# Patient Record
Sex: FEMALE | Race: WHITE | NOT HISPANIC OR LATINO
[De-identification: names, ages, dates, MRNs, and addresses within clinical notes are randomized per-mention and may not be internally consistent; named-entity substitution may affect disease eponyms.]

---

## 2017-01-24 ENCOUNTER — APPOINTMENT (OUTPATIENT)
Dept: ANTEPARTUM | Facility: CLINIC | Age: 37
End: 2017-01-24

## 2017-01-24 ENCOUNTER — ASOB RESULT (OUTPATIENT)
Age: 37
End: 2017-01-24

## 2017-01-24 ENCOUNTER — LABORATORY RESULT (OUTPATIENT)
Age: 37
End: 2017-01-24

## 2017-01-24 ENCOUNTER — APPOINTMENT (OUTPATIENT)
Dept: MATERNAL FETAL MEDICINE | Facility: CLINIC | Age: 37
End: 2017-01-24

## 2017-01-24 VITALS
SYSTOLIC BLOOD PRESSURE: 108 MMHG | HEART RATE: 98 BPM | BODY MASS INDEX: 26.35 KG/M2 | DIASTOLIC BLOOD PRESSURE: 68 MMHG | HEIGHT: 62 IN | WEIGHT: 143.2 LBS | OXYGEN SATURATION: 100 %

## 2017-01-24 LAB
BILIRUB UR QL STRIP: NEGATIVE
COLLECTION METHOD: NORMAL
GLUCOSE UR-MCNC: NEGATIVE
HCG UR QL: 0.2 EU/DL
HGB UR QL STRIP.AUTO: NEGATIVE
KETONES UR-MCNC: NEGATIVE
LEUKOCYTE ESTERASE UR QL STRIP: NORMAL
NITRITE UR QL STRIP: NEGATIVE
PH UR STRIP: 7.5
PROT UR STRIP-MCNC: NORMAL
SP GR UR STRIP: 1.02

## 2017-01-30 LAB
BASOPHILS # BLD AUTO: 0.02 K/UL
BASOPHILS NFR BLD AUTO: 0.3 %
EOSINOPHIL # BLD AUTO: 0.05 K/UL
EOSINOPHIL NFR BLD AUTO: 0.7 %
HCT VFR BLD CALC: 34.5 %
HGB BLD-MCNC: 11.5 G/DL
IMM GRANULOCYTES NFR BLD AUTO: 0.3 %
LYMPHOCYTES # BLD AUTO: 0.8 K/UL
LYMPHOCYTES NFR BLD AUTO: 11.3 %
MAN DIFF?: NORMAL
MCHC RBC-ENTMCNC: 30.3 PG
MCHC RBC-ENTMCNC: 33.3 GM/DL
MCV RBC AUTO: 91 FL
MONOCYTES # BLD AUTO: 0.73 K/UL
MONOCYTES NFR BLD AUTO: 10.3 %
NEUTROPHILS # BLD AUTO: 5.46 K/UL
NEUTROPHILS NFR BLD AUTO: 77.1 %
PLATELET # BLD AUTO: 252 K/UL
PLATELET # BLD AUTO: 252 K/UL
RBC # BLD: 3.79 M/UL
RBC # FLD: 14.3 %
WBC # FLD AUTO: 7.08 K/UL

## 2017-02-21 ENCOUNTER — APPOINTMENT (OUTPATIENT)
Dept: ANTEPARTUM | Facility: CLINIC | Age: 37
End: 2017-02-21

## 2017-02-21 ENCOUNTER — ASOB RESULT (OUTPATIENT)
Age: 37
End: 2017-02-21

## 2017-02-21 ENCOUNTER — APPOINTMENT (OUTPATIENT)
Dept: MATERNAL FETAL MEDICINE | Facility: CLINIC | Age: 37
End: 2017-02-21

## 2017-02-21 VITALS
HEIGHT: 62 IN | BODY MASS INDEX: 26.31 KG/M2 | SYSTOLIC BLOOD PRESSURE: 108 MMHG | DIASTOLIC BLOOD PRESSURE: 70 MMHG | WEIGHT: 143 LBS

## 2017-02-21 LAB
BILIRUB UR QL STRIP: NEGATIVE
COLLECTION METHOD: NORMAL
GLUCOSE UR-MCNC: NEGATIVE
HCG UR QL: 0.2 EU/DL
HGB UR QL STRIP.AUTO: NEGATIVE
KETONES UR-MCNC: NEGATIVE
LEUKOCYTE ESTERASE UR QL STRIP: NEGATIVE
NITRITE UR QL STRIP: NEGATIVE
PH UR STRIP: 6.5
PROT UR STRIP-MCNC: NEGATIVE
SP GR UR STRIP: <=1.005

## 2017-02-22 LAB — GLUCOSE 1H P 50 G GLC PO SERPL-MCNC: 132 MG/DL

## 2017-03-21 ENCOUNTER — ASOB RESULT (OUTPATIENT)
Age: 37
End: 2017-03-21

## 2017-03-21 ENCOUNTER — APPOINTMENT (OUTPATIENT)
Dept: ANTEPARTUM | Facility: CLINIC | Age: 37
End: 2017-03-21

## 2017-03-21 ENCOUNTER — APPOINTMENT (OUTPATIENT)
Dept: MATERNAL FETAL MEDICINE | Facility: CLINIC | Age: 37
End: 2017-03-21

## 2017-03-21 VITALS
BODY MASS INDEX: 28.63 KG/M2 | DIASTOLIC BLOOD PRESSURE: 64 MMHG | HEIGHT: 62 IN | WEIGHT: 155.6 LBS | SYSTOLIC BLOOD PRESSURE: 100 MMHG

## 2017-03-21 VITALS — HEART RATE: 81 BPM | OXYGEN SATURATION: 100 %

## 2017-03-21 LAB
BILIRUB UR QL STRIP: NEGATIVE
COLLECTION METHOD: NORMAL
GLUCOSE UR-MCNC: NEGATIVE
HCG UR QL: 0.2 EU/DL
HGB UR QL STRIP.AUTO: NEGATIVE
KETONES UR-MCNC: NEGATIVE
LEUKOCYTE ESTERASE UR QL STRIP: NORMAL
NITRITE UR QL STRIP: NEGATIVE
PH UR STRIP: 6.5
PROT UR STRIP-MCNC: NEGATIVE
SP GR UR STRIP: <=1.05

## 2017-04-18 ENCOUNTER — APPOINTMENT (OUTPATIENT)
Dept: ANTEPARTUM | Facility: CLINIC | Age: 37
End: 2017-04-18

## 2017-04-18 ENCOUNTER — ASOB RESULT (OUTPATIENT)
Age: 37
End: 2017-04-18

## 2017-05-18 ENCOUNTER — APPOINTMENT (OUTPATIENT)
Dept: MATERNAL FETAL MEDICINE | Facility: CLINIC | Age: 37
End: 2017-05-18

## 2017-05-18 ENCOUNTER — APPOINTMENT (OUTPATIENT)
Dept: ANTEPARTUM | Facility: CLINIC | Age: 37
End: 2017-05-18

## 2017-05-18 ENCOUNTER — ASOB RESULT (OUTPATIENT)
Age: 37
End: 2017-05-18

## 2017-05-18 VITALS
SYSTOLIC BLOOD PRESSURE: 120 MMHG | OXYGEN SATURATION: 97 % | DIASTOLIC BLOOD PRESSURE: 70 MMHG | WEIGHT: 165 LBS | BODY MASS INDEX: 30.36 KG/M2 | HEART RATE: 106 BPM | HEIGHT: 62 IN

## 2017-05-18 LAB
BILIRUB UR QL STRIP: NEGATIVE
COLLECTION METHOD: NORMAL
GLUCOSE UR-MCNC: NEGATIVE
HCG UR QL: 1 EU/DL
HGB UR QL STRIP.AUTO: NEGATIVE
KETONES UR-MCNC: NORMAL
LEUKOCYTE ESTERASE UR QL STRIP: NORMAL
NITRITE UR QL STRIP: NEGATIVE
PH UR STRIP: 6.5
PROT UR STRIP-MCNC: NORMAL
SP GR UR STRIP: 1.02

## 2017-05-19 ENCOUNTER — ASOB RESULT (OUTPATIENT)
Age: 37
End: 2017-05-19

## 2017-06-28 ENCOUNTER — OUTPATIENT (OUTPATIENT)
Dept: OUTPATIENT SERVICES | Facility: HOSPITAL | Age: 37
LOS: 1 days | End: 2017-06-28
Payer: COMMERCIAL

## 2017-06-28 DIAGNOSIS — Z87.2 PERSONAL HISTORY OF DISEASES OF THE SKIN AND SUBCUTANEOUS TISSUE: Chronic | ICD-10-CM

## 2017-06-28 DIAGNOSIS — Z98.89 OTHER SPECIFIED POSTPROCEDURAL STATES: Chronic | ICD-10-CM

## 2017-06-28 DIAGNOSIS — Z01.818 ENCOUNTER FOR OTHER PREPROCEDURAL EXAMINATION: ICD-10-CM

## 2017-06-28 LAB
ANION GAP SERPL CALC-SCNC: 15 MMOL/L — SIGNIFICANT CHANGE UP (ref 5–17)
APPEARANCE UR: CLEAR — SIGNIFICANT CHANGE UP
BACTERIA # UR AUTO: ABNORMAL
BASOPHILS # BLD AUTO: 0 K/UL — SIGNIFICANT CHANGE UP (ref 0–0.2)
BASOPHILS NFR BLD AUTO: 0.2 % — SIGNIFICANT CHANGE UP (ref 0–2)
BILIRUB UR-MCNC: NEGATIVE — SIGNIFICANT CHANGE UP
BLD GP AB SCN SERPL QL: SIGNIFICANT CHANGE UP
BUN SERPL-MCNC: 10 MG/DL — SIGNIFICANT CHANGE UP (ref 8–20)
CALCIUM SERPL-MCNC: 9 MG/DL — SIGNIFICANT CHANGE UP (ref 8.6–10.2)
CHLORIDE SERPL-SCNC: 99 MMOL/L — SIGNIFICANT CHANGE UP (ref 98–107)
CO2 SERPL-SCNC: 21 MMOL/L — LOW (ref 22–29)
COLOR SPEC: YELLOW — SIGNIFICANT CHANGE UP
COMMENT - BLOOD BANK: SIGNIFICANT CHANGE UP
CREAT SERPL-MCNC: 0.37 MG/DL — LOW (ref 0.5–1.3)
DIFF PNL FLD: NEGATIVE — SIGNIFICANT CHANGE UP
EOSINOPHIL # BLD AUTO: 0.1 K/UL — SIGNIFICANT CHANGE UP (ref 0–0.5)
EOSINOPHIL NFR BLD AUTO: 0.5 % — SIGNIFICANT CHANGE UP (ref 0–6)
EPI CELLS # UR: ABNORMAL
GLUCOSE SERPL-MCNC: 106 MG/DL — SIGNIFICANT CHANGE UP (ref 70–115)
GLUCOSE UR QL: NEGATIVE MG/DL — SIGNIFICANT CHANGE UP
HCT VFR BLD CALC: 35.7 % — LOW (ref 37–47)
HGB BLD-MCNC: 12.2 G/DL — SIGNIFICANT CHANGE UP (ref 12–16)
KETONES UR-MCNC: NEGATIVE — SIGNIFICANT CHANGE UP
LEUKOCYTE ESTERASE UR-ACNC: ABNORMAL
LYMPHOCYTES # BLD AUTO: 17.5 % — LOW (ref 20–55)
LYMPHOCYTES # BLD AUTO: 2 K/UL — SIGNIFICANT CHANGE UP (ref 1–4.8)
MCHC RBC-ENTMCNC: 30.7 PG — SIGNIFICANT CHANGE UP (ref 27–31)
MCHC RBC-ENTMCNC: 34.2 G/DL — SIGNIFICANT CHANGE UP (ref 32–36)
MCV RBC AUTO: 89.9 FL — SIGNIFICANT CHANGE UP (ref 81–99)
MONOCYTES # BLD AUTO: 0.7 K/UL — SIGNIFICANT CHANGE UP (ref 0–0.8)
MONOCYTES NFR BLD AUTO: 6.1 % — SIGNIFICANT CHANGE UP (ref 3–10)
NEUTROPHILS # BLD AUTO: 8.3 K/UL — HIGH (ref 1.8–8)
NEUTROPHILS NFR BLD AUTO: 74.4 % — HIGH (ref 37–73)
NITRITE UR-MCNC: NEGATIVE — SIGNIFICANT CHANGE UP
PH UR: 7 — SIGNIFICANT CHANGE UP (ref 5–8)
PLATELET # BLD AUTO: 243 K/UL — SIGNIFICANT CHANGE UP (ref 150–400)
POTASSIUM SERPL-MCNC: 3.9 MMOL/L — SIGNIFICANT CHANGE UP (ref 3.5–5.3)
POTASSIUM SERPL-SCNC: 3.9 MMOL/L — SIGNIFICANT CHANGE UP (ref 3.5–5.3)
PROT UR-MCNC: NEGATIVE MG/DL — SIGNIFICANT CHANGE UP
RBC # BLD: 3.97 M/UL — LOW (ref 4.4–5.2)
RBC # FLD: 14.2 % — SIGNIFICANT CHANGE UP (ref 11–15.6)
SODIUM SERPL-SCNC: 135 MMOL/L — SIGNIFICANT CHANGE UP (ref 135–145)
SP GR SPEC: 1.01 — SIGNIFICANT CHANGE UP (ref 1.01–1.02)
TYPE + AB SCN PNL BLD: SIGNIFICANT CHANGE UP
UROBILINOGEN FLD QL: NEGATIVE MG/DL — SIGNIFICANT CHANGE UP
WBC # BLD: 11.1 K/UL — HIGH (ref 4.8–10.8)
WBC # FLD AUTO: 11.1 K/UL — HIGH (ref 4.8–10.8)
WBC UR QL: ABNORMAL

## 2017-06-28 PROCEDURE — 86901 BLOOD TYPING SEROLOGIC RH(D): CPT

## 2017-06-28 PROCEDURE — 86900 BLOOD TYPING SEROLOGIC ABO: CPT

## 2017-06-28 PROCEDURE — 80048 BASIC METABOLIC PNL TOTAL CA: CPT

## 2017-06-28 PROCEDURE — 86850 RBC ANTIBODY SCREEN: CPT

## 2017-06-28 PROCEDURE — 36415 COLL VENOUS BLD VENIPUNCTURE: CPT

## 2017-06-28 PROCEDURE — 81001 URINALYSIS AUTO W/SCOPE: CPT

## 2017-06-28 PROCEDURE — 85027 COMPLETE CBC AUTOMATED: CPT

## 2017-07-05 ENCOUNTER — TRANSCRIPTION ENCOUNTER (OUTPATIENT)
Age: 37
End: 2017-07-05

## 2017-07-05 ENCOUNTER — RESULT REVIEW (OUTPATIENT)
Age: 37
End: 2017-07-05

## 2017-07-05 ENCOUNTER — INPATIENT (INPATIENT)
Facility: HOSPITAL | Age: 37
LOS: 2 days | Discharge: HOME | End: 2017-07-08
Attending: SPECIALIST | Admitting: SPECIALIST
Payer: COMMERCIAL

## 2017-07-05 VITALS — WEIGHT: 169.76 LBS

## 2017-07-05 DIAGNOSIS — Z87.2 PERSONAL HISTORY OF DISEASES OF THE SKIN AND SUBCUTANEOUS TISSUE: Chronic | ICD-10-CM

## 2017-07-05 DIAGNOSIS — O34.219 MATERNAL CARE FOR UNSPECIFIED TYPE SCAR FROM PREVIOUS CESAREAN DELIVERY: ICD-10-CM

## 2017-07-05 DIAGNOSIS — Z98.89 OTHER SPECIFIED POSTPROCEDURAL STATES: Chronic | ICD-10-CM

## 2017-07-05 LAB
BLD GP AB SCN SERPL QL: SIGNIFICANT CHANGE UP
GAS PNL BLDCOV: 7.34 — SIGNIFICANT CHANGE UP (ref 7.11–7.36)
PCO2 BLDCOA: 55.7 MMHG — SIGNIFICANT CHANGE UP (ref 32.2–65.8)
PCO2 BLDCOV: 44.9 MMHG — SIGNIFICANT CHANGE UP (ref 27–49.4)
PH BLDCOA: 7.27 — SIGNIFICANT CHANGE UP (ref 7.11–7.36)
PO2 BLDCOA: 15.1 MMHG — SIGNIFICANT CHANGE UP (ref 6–30)
PO2 BLDCOA: 26.5 MMHG — SIGNIFICANT CHANGE UP (ref 17.4–41)
RPR SERPL-ACNC: SIGNIFICANT CHANGE UP
SAO2 % BLDCOA: SIGNIFICANT CHANGE UP
SAO2 % BLDCOV: SIGNIFICANT CHANGE UP
TYPE + AB SCN PNL BLD: SIGNIFICANT CHANGE UP

## 2017-07-05 PROCEDURE — 88304 TISSUE EXAM BY PATHOLOGIST: CPT | Mod: 26

## 2017-07-05 RX ORDER — ACETAMINOPHEN 500 MG
1000 TABLET ORAL ONCE
Qty: 0 | Refills: 0 | Status: DISCONTINUED | OUTPATIENT
Start: 2017-07-05 | End: 2017-07-08

## 2017-07-05 RX ORDER — ONDANSETRON 8 MG/1
4 TABLET, FILM COATED ORAL EVERY 6 HOURS
Qty: 0 | Refills: 0 | Status: DISCONTINUED | OUTPATIENT
Start: 2017-07-05 | End: 2017-07-08

## 2017-07-05 RX ORDER — OXYTOCIN 10 UNIT/ML
333.33 VIAL (ML) INJECTION
Qty: 20 | Refills: 0 | Status: DISCONTINUED | OUTPATIENT
Start: 2017-07-05 | End: 2017-07-08

## 2017-07-05 RX ORDER — LANOLIN
1 OINTMENT (GRAM) TOPICAL
Qty: 0 | Refills: 0 | Status: DISCONTINUED | OUTPATIENT
Start: 2017-07-05 | End: 2017-07-08

## 2017-07-05 RX ORDER — SODIUM CHLORIDE 9 MG/ML
1000 INJECTION, SOLUTION INTRAVENOUS ONCE
Qty: 0 | Refills: 0 | Status: COMPLETED | OUTPATIENT
Start: 2017-07-05 | End: 2017-07-05

## 2017-07-05 RX ORDER — KETOROLAC TROMETHAMINE 30 MG/ML
30 SYRINGE (ML) INJECTION ONCE
Qty: 0 | Refills: 0 | Status: DISCONTINUED | OUTPATIENT
Start: 2017-07-05 | End: 2017-07-05

## 2017-07-05 RX ORDER — DIPHENHYDRAMINE HCL 50 MG
25 CAPSULE ORAL EVERY 6 HOURS
Qty: 0 | Refills: 0 | Status: DISCONTINUED | OUTPATIENT
Start: 2017-07-05 | End: 2017-07-08

## 2017-07-05 RX ORDER — DOCUSATE SODIUM 100 MG
100 CAPSULE ORAL
Qty: 0 | Refills: 0 | Status: DISCONTINUED | OUTPATIENT
Start: 2017-07-05 | End: 2017-07-08

## 2017-07-05 RX ORDER — ACETAMINOPHEN 500 MG
1000 TABLET ORAL ONCE
Qty: 0 | Refills: 0 | Status: COMPLETED | OUTPATIENT
Start: 2017-07-05 | End: 2017-07-05

## 2017-07-05 RX ORDER — GLYCERIN ADULT
1 SUPPOSITORY, RECTAL RECTAL AT BEDTIME
Qty: 0 | Refills: 0 | Status: DISCONTINUED | OUTPATIENT
Start: 2017-07-05 | End: 2017-07-08

## 2017-07-05 RX ORDER — FERROUS SULFATE 325(65) MG
325 TABLET ORAL DAILY
Qty: 0 | Refills: 0 | Status: DISCONTINUED | OUTPATIENT
Start: 2017-07-05 | End: 2017-07-08

## 2017-07-05 RX ORDER — DIPHENHYDRAMINE HCL 50 MG
25 CAPSULE ORAL EVERY 4 HOURS
Qty: 0 | Refills: 0 | Status: DISCONTINUED | OUTPATIENT
Start: 2017-07-05 | End: 2017-07-08

## 2017-07-05 RX ORDER — SODIUM CHLORIDE 9 MG/ML
1000 INJECTION, SOLUTION INTRAVENOUS
Qty: 0 | Refills: 0 | Status: DISCONTINUED | OUTPATIENT
Start: 2017-07-05 | End: 2017-07-08

## 2017-07-05 RX ORDER — OXYTOCIN 10 UNIT/ML
41.67 VIAL (ML) INJECTION
Qty: 20 | Refills: 0 | Status: DISCONTINUED | OUTPATIENT
Start: 2017-07-05 | End: 2017-07-08

## 2017-07-05 RX ORDER — ONDANSETRON 8 MG/1
4 TABLET, FILM COATED ORAL ONCE
Qty: 0 | Refills: 0 | Status: DISCONTINUED | OUTPATIENT
Start: 2017-07-05 | End: 2017-07-08

## 2017-07-05 RX ORDER — SIMETHICONE 80 MG/1
80 TABLET, CHEWABLE ORAL EVERY 4 HOURS
Qty: 0 | Refills: 0 | Status: DISCONTINUED | OUTPATIENT
Start: 2017-07-05 | End: 2017-07-08

## 2017-07-05 RX ORDER — TETANUS TOXOID, REDUCED DIPHTHERIA TOXOID AND ACELLULAR PERTUSSIS VACCINE, ADSORBED 5; 2.5; 8; 8; 2.5 [IU]/.5ML; [IU]/.5ML; UG/.5ML; UG/.5ML; UG/.5ML
0.5 SUSPENSION INTRAMUSCULAR ONCE
Qty: 0 | Refills: 0 | Status: DISCONTINUED | OUTPATIENT
Start: 2017-07-05 | End: 2017-07-08

## 2017-07-05 RX ORDER — IBUPROFEN 200 MG
600 TABLET ORAL EVERY 6 HOURS
Qty: 0 | Refills: 0 | Status: DISCONTINUED | OUTPATIENT
Start: 2017-07-05 | End: 2017-07-08

## 2017-07-05 RX ORDER — METOCLOPRAMIDE HCL 10 MG
10 TABLET ORAL ONCE
Qty: 0 | Refills: 0 | Status: DISCONTINUED | OUTPATIENT
Start: 2017-07-05 | End: 2017-07-08

## 2017-07-05 RX ORDER — DIPHENHYDRAMINE HCL 50 MG
25 CAPSULE ORAL ONCE
Qty: 0 | Refills: 0 | Status: DISCONTINUED | OUTPATIENT
Start: 2017-07-05 | End: 2017-07-08

## 2017-07-05 RX ORDER — KETOROLAC TROMETHAMINE 30 MG/ML
30 SYRINGE (ML) INJECTION EVERY 6 HOURS
Qty: 0 | Refills: 0 | Status: DISCONTINUED | OUTPATIENT
Start: 2017-07-05 | End: 2017-07-08

## 2017-07-05 RX ORDER — ACETAMINOPHEN 500 MG
650 TABLET ORAL EVERY 6 HOURS
Qty: 0 | Refills: 0 | Status: DISCONTINUED | OUTPATIENT
Start: 2017-07-05 | End: 2017-07-08

## 2017-07-05 RX ORDER — CITRIC ACID/SODIUM CITRATE 300-500 MG
30 SOLUTION, ORAL ORAL ONCE
Qty: 0 | Refills: 0 | Status: COMPLETED | OUTPATIENT
Start: 2017-07-05 | End: 2017-07-05

## 2017-07-05 RX ORDER — CEFAZOLIN SODIUM 1 G
2000 VIAL (EA) INJECTION ONCE
Qty: 0 | Refills: 0 | Status: COMPLETED | OUTPATIENT
Start: 2017-07-05 | End: 2017-07-05

## 2017-07-05 RX ORDER — NALOXONE HYDROCHLORIDE 4 MG/.1ML
0.1 SPRAY NASAL
Qty: 0 | Refills: 0 | Status: DISCONTINUED | OUTPATIENT
Start: 2017-07-05 | End: 2017-07-08

## 2017-07-05 RX ADMIN — Medication 100 MILLIGRAM(S): at 12:20

## 2017-07-05 RX ADMIN — Medication 1000 MILLIGRAM(S): at 14:51

## 2017-07-05 RX ADMIN — Medication 1000 MILLIUNIT(S)/MIN: at 13:18

## 2017-07-05 RX ADMIN — Medication 125 MILLIUNIT(S)/MIN: at 14:46

## 2017-07-05 RX ADMIN — Medication 30 MILLIGRAM(S): at 15:34

## 2017-07-05 RX ADMIN — Medication 30 MILLILITER(S): at 12:05

## 2017-07-05 RX ADMIN — SODIUM CHLORIDE 2000 MILLILITER(S): 9 INJECTION, SOLUTION INTRAVENOUS at 09:40

## 2017-07-05 RX ADMIN — Medication 400 MILLIGRAM(S): at 14:36

## 2017-07-05 NOTE — DISCHARGE NOTE OB - CARE PLAN
Principal Discharge DX:	 delivery delivered  Goal:	return to normal activity ; routine post partum course  Instructions for follow-up, activity and diet:	as tolerated

## 2017-07-05 NOTE — DISCHARGE NOTE OB - CARE PROVIDER_API CALL
Rustam Jaeger (MD), Obstetrics and Gynecology  [address] [place] [address]  Phone: [phone]  Fax: [phone]

## 2017-07-05 NOTE — DISCHARGE NOTE OB - MEDICATION SUMMARY - MEDICATIONS TO STOP TAKING
I will STOP taking the medications listed below when I get home from the hospital:    Lovenox 40 mg/0.4 mL injectable solution  --  injectable once a day  -- last dose 6-24-17    aspirin 81 mg oral tablet  -- 1 tab(s) by mouth once a day    heparin 5000 units/mL injectable solution  --  injectable

## 2017-07-05 NOTE — DISCHARGE NOTE OB - PATIENT PORTAL LINK FT
“You can access the [employer] Patient Portal, offered by [facility], by registering with the following website: [email or web address]”

## 2017-07-05 NOTE — DISCHARGE NOTE OB - MEDICATION SUMMARY - MEDICATIONS TO TAKE
I will START or STAY ON the medications listed below when I get home from the hospital:    Percocet 5/325 oral tablet  -- 1 tab(s) by mouth every 6 hours MDD:4 tabs  -- Indication: For prn pain     ibuprofen 600 mg oral tablet  -- 1 tab(s) by mouth every 6 hours, As needed, Mild pain or headache MDD:4  -- Indication: For prn pain

## 2017-07-06 LAB
EOSINOPHIL NFR BLD AUTO: 1 % — SIGNIFICANT CHANGE UP (ref 0–5)
HCT VFR BLD CALC: 30.3 % — LOW (ref 37–47)
HGB BLD-MCNC: 10.3 G/DL — LOW (ref 12–16)
LYMPHOCYTES # BLD AUTO: 8 % — LOW (ref 20–55)
MCHC RBC-ENTMCNC: 30.7 PG — SIGNIFICANT CHANGE UP (ref 27–31)
MCHC RBC-ENTMCNC: 34 G/DL — SIGNIFICANT CHANGE UP (ref 32–36)
MCV RBC AUTO: 90.4 FL — SIGNIFICANT CHANGE UP (ref 81–99)
MONOCYTES NFR BLD AUTO: 5 % — SIGNIFICANT CHANGE UP (ref 3–10)
NEUTROPHILS NFR BLD AUTO: 85 % — HIGH (ref 37–73)
NEUTS BAND # BLD: 1 % — SIGNIFICANT CHANGE UP (ref 0–8)
PLAT MORPH BLD: NORMAL — SIGNIFICANT CHANGE UP
PLATELET # BLD AUTO: 250 K/UL — SIGNIFICANT CHANGE UP (ref 150–400)
RBC # BLD: 3.35 M/UL — LOW (ref 4.4–5.2)
RBC # FLD: 14 % — SIGNIFICANT CHANGE UP (ref 11–15.6)
RBC BLD AUTO: NORMAL — SIGNIFICANT CHANGE UP
WBC # BLD: 22.1 K/UL — HIGH (ref 4.8–10.8)
WBC # FLD AUTO: 22.1 K/UL — HIGH (ref 4.8–10.8)

## 2017-07-06 RX ADMIN — Medication 30 MILLIGRAM(S): at 06:48

## 2017-07-06 RX ADMIN — SIMETHICONE 80 MILLIGRAM(S): 80 TABLET, CHEWABLE ORAL at 22:37

## 2017-07-06 RX ADMIN — Medication 600 MILLIGRAM(S): at 23:15

## 2017-07-06 RX ADMIN — Medication 600 MILLIGRAM(S): at 22:31

## 2017-07-06 RX ADMIN — Medication 30 MILLIGRAM(S): at 07:05

## 2017-07-06 NOTE — PROGRESS NOTE ADULT - SUBJECTIVE AND OBJECTIVE BOX
Postpartum Note,  Section  She is a  36y woman who is post-operative day 1    Subjective:  The patient feels well.  She has ambulated and is tolerating a diet.  She is having  flatus, but no BM yet. Baltazar catheter still in place.   She reports no breathing problems.  She reports no headache or visual changes  She reports normal postpartum bleeding    Physical exam:  She generally looks and feels well    Vital Signs Last 24 Hrs  T(C): 36.8 (2017 23:36), Max: 36.8 (2017 23:36)  T(F): 98.3 (2017 23:36), Max: 98.3 (2017 23:36)  HR: 61 (2017 23:36) (60 - 72)  BP: 106/64 (2017 23:36) (97/69 - 120/70)  BP(mean): --  RR: 18 (2017 23:36) (14 - 23)  SpO2: 98% (2017 16:05) (97% - 99%)    Lungs: Normal  Heart: Regular rate and rhythm  Abdomen: Soft, nontender, no distension , firm uterine fundus below umbilicus, the incision is clean dry and intact  Pelvic: Normal lochia noted  Ext: No DVT signs, warm extremities, normal pulses      Allergies    No Known Allergies

## 2017-07-06 NOTE — PROGRESS NOTE ADULT - SUBJECTIVE AND OBJECTIVE BOX
INTERVAL HPI/OVERNIGHT EVENTS:  36y Female s/p c section under spinal anesthesia with duramorph for post op analgesia on 7/5/17    Vital Signs Last 24 Hrs  T(C): 36.8 (06 Jul 2017 05:29), Max: 36.8 (05 Jul 2017 23:36)  T(F): 98.2 (06 Jul 2017 05:29), Max: 98.3 (05 Jul 2017 23:36)  HR: 70 (06 Jul 2017 05:29) (61 - 70)  BP: 103/63 (06 Jul 2017 05:29) (103/63 - 106/64)  BP(mean): --  RR: 18 (06 Jul 2017 05:29) (18 - 18)  SpO2: --    Patient seen, doing well, no anesthetic complications or complaints noted or reported.  Pain is controlled.

## 2017-07-06 NOTE — PROGRESS NOTE ADULT - PROBLEM SELECTOR PLAN 1
Continue the current pain medication  Encourage Ambulation  Encourage regular diet   DVT ppx: SCDs  She is stable, tolerates a diet and has normal flatus and bowel movements  She will be discharged on Day 2,3 or 4   according to the normal criteria.

## 2017-07-07 RX ORDER — IBUPROFEN 200 MG
1 TABLET ORAL
Qty: 40 | Refills: 0
Start: 2017-07-07

## 2017-07-07 RX ORDER — FERROUS SULFATE 325(65) MG
1 TABLET ORAL
Qty: 0 | Refills: 0 | COMMUNITY

## 2017-07-07 RX ORDER — ASPIRIN/CALCIUM CARB/MAGNESIUM 324 MG
1 TABLET ORAL
Qty: 0 | Refills: 0 | COMMUNITY

## 2017-07-07 RX ORDER — ENOXAPARIN SODIUM 100 MG/ML
0 INJECTION SUBCUTANEOUS
Qty: 0 | Refills: 0 | COMMUNITY

## 2017-07-07 RX ORDER — HEPARIN SODIUM 5000 [USP'U]/ML
0 INJECTION INTRAVENOUS; SUBCUTANEOUS
Qty: 0 | Refills: 0 | COMMUNITY

## 2017-07-07 RX ORDER — FOLIC ACID 0.8 MG
1 TABLET ORAL
Qty: 0 | Refills: 0 | COMMUNITY

## 2017-07-07 RX ADMIN — Medication 600 MILLIGRAM(S): at 04:43

## 2017-07-07 RX ADMIN — Medication 650 MILLIGRAM(S): at 14:14

## 2017-07-07 RX ADMIN — Medication 650 MILLIGRAM(S): at 20:17

## 2017-07-07 RX ADMIN — SIMETHICONE 80 MILLIGRAM(S): 80 TABLET, CHEWABLE ORAL at 20:23

## 2017-07-07 RX ADMIN — Medication 600 MILLIGRAM(S): at 22:57

## 2017-07-07 RX ADMIN — Medication 600 MILLIGRAM(S): at 10:15

## 2017-07-07 RX ADMIN — SIMETHICONE 80 MILLIGRAM(S): 80 TABLET, CHEWABLE ORAL at 04:43

## 2017-07-07 RX ADMIN — Medication 600 MILLIGRAM(S): at 16:58

## 2017-07-07 RX ADMIN — Medication 600 MILLIGRAM(S): at 23:50

## 2017-07-07 NOTE — PROGRESS NOTE ADULT - SUBJECTIVE AND OBJECTIVE BOX
~Postpartum Note,  Section, REPEATMALE/FEMALE  She is a  36y woman who is now post-operative day 2 :     Subjective:  The patient feels well.  She is ambulating.   She is tolerating regular diet.      Physical exam:    Vital Signs Last 24 Hrs  T(C): 36.4 (2017 08:06), Max: 36.6 (2017 19:22)  T(F): 97.5 (2017 08:06), Max: 97.8 (2017 19:22)  HR: 100 (2017 08:06) (82 - 100)  BP: 130/84 (2017 08:06) (112/72 - 130/84)  BP(mean): --  RR: 20 (2017 08:06) (18 - 20)  SpO2: --    Gen: NAD    Abdomen: Soft, nontender, no distension , firm uterine fundus at umbilicus.  Incision: Clean, dry, and intact with steri strips  Pelvic: Normal lochia noted  Ext: No calf tenderness    LABS:                        10.3   22.1  )-----------( 250      ( 2017 06:19 )             30.3             Assessment and Plan  POD # 2  s/p repeat  section   Doing well.  Encourage ambulation.

## 2017-07-07 NOTE — PROGRESS NOTE ADULT - SUBJECTIVE AND OBJECTIVE BOX
Postpartum Note,  Section  She is a  36y  who is now post-operative day 2 from c/s.    Subjective:  The patient feels well.  She is ambulating and tolerating a diet.  + flatus, no BM  She reports no breathing problems.  She reports mild headache, but attributes it to not sleeping last night. No visual changes. No RUQ pain  She reports normal postpartum bleeding.    Physical exam:  She generally looks and feels well    Vital Signs Last 24 Hrs  T(F): 97.8 (2017 19:22), Max: 97.8 (2017 19:22)  HR: 82 (2017 19:22) (82 - 82)  BP: 112/72 (2017 19:22) (112/72 - 112/72)  RR: 18 (2017 19:22) (18 - 18)      Lungs: Normal  Heart: Regular rate and rhythm  Abdomen: Soft, nontender, no distension , firm uterine fundus, the incision is clean dry and intact  Pelvic: Normal lochia noted  Ext: No DVT signs, warm extremities, normal pulses    LABS:                        10.3   22.1  )-----------( 250      ( 2017 06:19 )             30.3         Allergies    No Known Allergies

## 2017-07-07 NOTE — PROGRESS NOTE ADULT - PROBLEM SELECTOR PLAN 1
Continue the current pain medication  Encourage Ambulation  Encourage regular diet   DVT ppx: SCDs  She is stable, tolerates a diet and has normal flatus and bowel movements  She will be discharged on Day 3 or 4   according to the normal criteria.

## 2017-07-07 NOTE — PROGRESS NOTE ADULT - ASSESSMENT
Section Day: 2  She feels well. Pt reports mild headache  Section Day: 2  She feels well. Pt reports mild headache, morning /63 so probably not symptom of Pre-e. Pt reports getting HA with little sleep.

## 2017-07-08 VITALS
DIASTOLIC BLOOD PRESSURE: 71 MMHG | SYSTOLIC BLOOD PRESSURE: 113 MMHG | TEMPERATURE: 98 F | HEART RATE: 92 BPM | RESPIRATION RATE: 18 BRPM

## 2017-07-08 PROCEDURE — 88304 TISSUE EXAM BY PATHOLOGIST: CPT

## 2017-07-08 PROCEDURE — 59050 FETAL MONITOR W/REPORT: CPT

## 2017-07-08 PROCEDURE — 85027 COMPLETE CBC AUTOMATED: CPT

## 2017-07-08 PROCEDURE — 86592 SYPHILIS TEST NON-TREP QUAL: CPT

## 2017-07-08 PROCEDURE — 36415 COLL VENOUS BLD VENIPUNCTURE: CPT

## 2017-07-08 PROCEDURE — 86901 BLOOD TYPING SEROLOGIC RH(D): CPT

## 2017-07-08 PROCEDURE — 86900 BLOOD TYPING SEROLOGIC ABO: CPT

## 2017-07-08 PROCEDURE — 82803 BLOOD GASES ANY COMBINATION: CPT

## 2017-07-08 PROCEDURE — 86850 RBC ANTIBODY SCREEN: CPT

## 2017-07-08 RX ADMIN — Medication 600 MILLIGRAM(S): at 11:47

## 2017-07-08 RX ADMIN — Medication 650 MILLIGRAM(S): at 02:17

## 2017-07-08 RX ADMIN — Medication 600 MILLIGRAM(S): at 05:39

## 2017-07-08 RX ADMIN — Medication 600 MILLIGRAM(S): at 06:14

## 2017-07-08 RX ADMIN — Medication 650 MILLIGRAM(S): at 08:40

## 2017-07-08 NOTE — PROGRESS NOTE ADULT - PROBLEM SELECTOR PLAN 1
Continue the current pain medication. Encourage ambulation and regular diet. Continue DVT ppx: SCDs.  Plan to discharge on day 3 or 4 according to the normal criteria.

## 2017-07-08 NOTE — PROGRESS NOTE ADULT - SUBJECTIVE AND OBJECTIVE BOX
Postpartum Note,  Section  She is a  36y woman who is now post-operative day 3    Subjective:  The patient feels well.  She is ambulating and tolerating a diet  She is having bowel movements and flatus  She reports no breathing problems  She reports no headache or visual changes  She reports normal postpartum bleeding    Physical exam:  She generally looks and feels well    Vital Signs Last 24 Hrs  T(C): 36.9 (2017 08:32), Max: 37.2 (2017 04:34)  T(F): 98.4 (2017 08:32), Max: 98.9 (2017 04:34)  HR: 92 (2017 08:32) (79 - 92)  BP: 113/71 (2017 08:32) (113/71 - 123/79)  BP(mean): --  RR: 18 (2017 08:32) (18 - 20)  SpO2: --    Lungs: Normal  Heart: Regular rate and rhythm  Abdomen: Soft, nontender, no distension , firm uterine fundus, the incision is clean dry and intact  Pelvic: Normal lochia noted  Ext: No DVT signs, warm extremities, normal pulses    LABS:                Allergies    No Known Allergies    Intolerances      MEDICATIONS  (STANDING):  lactated ringers. 1000 milliLiter(s) (125 mL/Hr) IV Continuous <Continuous>  lactated ringers. 1000 milliLiter(s) (125 mL/Hr) IV Continuous <Continuous>  oxytocin Infusion 333.333 milliUNIT(s)/Min (1000 mL/Hr) IV Continuous <Continuous>  oxytocin Infusion 41.667 milliUNIT(s)/Min (125 mL/Hr) IV Continuous <Continuous>  lactated ringers. 1000 milliLiter(s) (125 mL/Hr) IV Continuous <Continuous>  diphtheria/tetanus/pertussis (acellular) Vaccine (ADAcel) 0.5 milliLiter(s) IntraMuscular once  oxytocin Infusion 41.667 milliUNIT(s)/Min (125 mL/Hr) IV Continuous <Continuous>  ferrous    sulfate 325 milliGRAM(s) Oral daily  prenatal multivitamin 1 Tablet(s) Oral daily    MEDICATIONS  (PRN):  metoclopramide Injectable 10 milliGRAM(s) IV Push once PRN Nausea and/or Vomiting  ondansetron Injectable 4 milliGRAM(s) IV Push once PRN Postoperative Nausea and  Vomiting  diphenhydrAMINE   Injectable 25 milliGRAM(s) IV Push once PRN Itching  acetaminophen  IVPB. 1000 milliGRAM(s) IV Intermittent once PRN Moderate Pain (4 - 6)  naloxone Injectable 0.1 milliGRAM(s) IV Push every 3 minutes PRN For ANY of the following changes in patient status:  A. RR LESS THAN 10 breaths per minute, B. Oxygen saturation LESS THAN 90%, C. Sedation score of 6  ondansetron Injectable 4 milliGRAM(s) IV Push every 6 hours PRN Nausea  diphenhydrAMINE   Capsule 25 milliGRAM(s) Oral every 4 hours PRN Pruritus  ketorolac   Injectable 30 milliGRAM(s) IV Push every 6 hours PRN Moderate Pain (4 - 6)  acetaminophen  IVPB. 1000 milliGRAM(s) IV Intermittent once PRN Moderate Pain (4 - 6)  acetaminophen   Tablet 650 milliGRAM(s) Oral every 6 hours PRN For Temp greater than 38.5 C (101.3 F)  ibuprofen  Tablet 600 milliGRAM(s) Oral every 6 hours PRN Mild pain or headache  oxyCODONE  5 mG/acetaminophen 325 mG 1 Tablet(s) Oral every 3 hours PRN Moderate Pain  oxyCODONE  5 mG/acetaminophen 325 mG 2 Tablet(s) Oral every 4 hours PRN Severe Pain  simethicone 80 milliGRAM(s) Chew every 4 hours PRN Gas  diphenhydrAMINE   Capsule 25 milliGRAM(s) Oral every 6 hours PRN Itching  glycerin Suppository - Adult 1 Suppository(s) Rectal at bedtime PRN Constipation  docusate sodium 100 milliGRAM(s) Oral two times a day PRN Stool Softening  lanolin Ointment 1 Application(s) Topical every 3 hours PRN Sore Nipples      RADIOLOGY & ADDITIONAL TESTS:

## 2017-07-08 NOTE — PROGRESS NOTE ADULT - ASSESSMENT
Section Day:   She feels well  Continue the current pain medication  Encourage Ambulation  Encourage regular diet   DVT ppx: SCDs  She is stable, tolerates a diet and has normal flatus and bowel movements    She will be discharged today according to the normal criteria.    She will follow-up in our office in one month

## 2017-07-08 NOTE — PROGRESS NOTE ADULT - SUBJECTIVE AND OBJECTIVE BOX
Postpartum Note,  Section  Patient is 36y s/p  post-operative day 3.    Subjective:  Patient reports a H/A which resolved with medication. The patient is feeling well. She is tolerating a diet and denies SOB.  She is having bowel movements and reports flatus. Patient is having normal postpartum bleeding which is decreasing in amount.     Physical exam:    Vital Signs Last 24 Hrs  T(C): 36.9 (2017 08:32), Max: 37.2 (2017 04:34)  T(F): 98.4 (2017 08:32), Max: 98.9 (2017 04:34)  HR: 92 (2017 08:32) (79 - 92)  BP: 113/71 (2017 08:32) (113/71 - 123/79)  BP(mean): --  RR: 18 (2017 08:32) (18 - 20)  SpO2: --    Lungs: CTABL  Heart: Regular rate and rhythm  Abdomen: Soft, nontender, no distension , firm uterine fundus, the incision is clean dry and intact  Ext: No DVT signs, warm extremities

## 2017-07-12 LAB — SURGICAL PATHOLOGY FINAL REPORT - CH: SIGNIFICANT CHANGE UP

## 2017-11-28 ENCOUNTER — APPOINTMENT (OUTPATIENT)
Dept: INTERNAL MEDICINE | Facility: CLINIC | Age: 37
End: 2017-11-28

## 2018-07-16 ENCOUNTER — APPOINTMENT (OUTPATIENT)
Dept: INTERNAL MEDICINE | Facility: CLINIC | Age: 38
End: 2018-07-16

## 2018-09-26 ENCOUNTER — RESULT REVIEW (OUTPATIENT)
Age: 38
End: 2018-09-26

## 2018-11-20 ENCOUNTER — ASOB RESULT (OUTPATIENT)
Age: 38
End: 2018-11-20

## 2018-11-20 ENCOUNTER — APPOINTMENT (OUTPATIENT)
Dept: MATERNAL FETAL MEDICINE | Facility: CLINIC | Age: 38
End: 2018-11-20
Payer: COMMERCIAL

## 2018-11-20 ENCOUNTER — APPOINTMENT (OUTPATIENT)
Dept: ANTEPARTUM | Facility: CLINIC | Age: 38
End: 2018-11-20
Payer: COMMERCIAL

## 2018-11-20 VITALS
OXYGEN SATURATION: 100 % | BODY MASS INDEX: 32.02 KG/M2 | HEIGHT: 62 IN | WEIGHT: 174 LBS | DIASTOLIC BLOOD PRESSURE: 78 MMHG | HEART RATE: 76 BPM | SYSTOLIC BLOOD PRESSURE: 126 MMHG

## 2018-11-20 PROCEDURE — 76801 OB US < 14 WKS SINGLE FETUS: CPT

## 2018-11-20 PROCEDURE — 99244 OFF/OP CNSLTJ NEW/EST MOD 40: CPT | Mod: 25

## 2018-11-28 LAB
CLARIM 15Q11.2: NORMAL
CLARIM 1P36: NORMAL
CLARIM 22Q11.2: NORMAL
CLARIM 4P-/WOLF-HIRSCHHORN: NORMAL
CLARIM 5P-/CRI DU CHAT: NORMAL
CLARIM ADDITIONAL INFO: NORMAL
CLARIM CHROMOSOME 13: NORMAL
CLARIM CHROMOSOME 18: NORMAL
CLARIM CHROMOSOME 21: NORMAL
CLARIM SEX CHROMOSOMES: NORMAL
CLARITEST NIPT W/MICRO: NORMAL

## 2018-12-06 ENCOUNTER — OTHER (OUTPATIENT)
Age: 38
End: 2018-12-06

## 2018-12-06 ENCOUNTER — LABORATORY RESULT (OUTPATIENT)
Age: 38
End: 2018-12-06

## 2018-12-06 LAB
ALBUMIN SERPL ELPH-MCNC: 4.2 G/DL
ALP BLD-CCNC: 49 U/L
ALT SERPL-CCNC: 10 U/L
ANION GAP SERPL CALC-SCNC: 11 MMOL/L
AST SERPL-CCNC: 12 U/L
BILIRUB SERPL-MCNC: 0.4 MG/DL
BUN SERPL-MCNC: 6 MG/DL
CALCIUM SERPL-MCNC: 9.1 MG/DL
CHLORIDE SERPL-SCNC: 103 MMOL/L
CO2 SERPL-SCNC: 22 MMOL/L
CREAT SERPL-MCNC: 0.44 MG/DL
CREAT SPEC-SCNC: 19 MG/DL
CREAT/PROT UR: 0.2 RATIO
GLUCOSE SERPL-MCNC: 76 MG/DL
POTASSIUM SERPL-SCNC: 4.3 MMOL/L
PROT SERPL-MCNC: 7.1 G/DL
PROT UR-MCNC: 4 MG/DL
SODIUM SERPL-SCNC: 136 MMOL/L

## 2018-12-13 ENCOUNTER — MEDICATION RENEWAL (OUTPATIENT)
Age: 38
End: 2018-12-13

## 2018-12-13 ENCOUNTER — APPOINTMENT (OUTPATIENT)
Dept: ANTEPARTUM | Facility: CLINIC | Age: 38
End: 2018-12-13
Payer: COMMERCIAL

## 2018-12-13 ENCOUNTER — ASOB RESULT (OUTPATIENT)
Age: 38
End: 2018-12-13

## 2018-12-13 PROCEDURE — 76805 OB US >/= 14 WKS SNGL FETUS: CPT

## 2018-12-13 RX ORDER — VIT D3/FOLIC ACID/B2/B6/B12 2000-800
TABLET ORAL
Qty: 60 | Refills: 3 | Status: DISCONTINUED | COMMUNITY
Start: 2018-11-27 | End: 2018-12-13

## 2018-12-28 ENCOUNTER — RECORD ABSTRACTING (OUTPATIENT)
Age: 38
End: 2018-12-28

## 2018-12-28 DIAGNOSIS — Z92.89 PERSONAL HISTORY OF OTHER MEDICAL TREATMENT: ICD-10-CM

## 2018-12-28 DIAGNOSIS — Z80.41 FAMILY HISTORY OF MALIGNANT NEOPLASM OF OVARY: ICD-10-CM

## 2018-12-28 DIAGNOSIS — Z86.59 PERSONAL HISTORY OF OTHER MENTAL AND BEHAVIORAL DISORDERS: ICD-10-CM

## 2018-12-28 DIAGNOSIS — Z86.19 PERSONAL HISTORY OF OTHER INFECTIOUS AND PARASITIC DISEASES: ICD-10-CM

## 2018-12-28 DIAGNOSIS — Z82.49 FAMILY HISTORY OF ISCHEMIC HEART DISEASE AND OTHER DISEASES OF THE CIRCULATORY SYSTEM: ICD-10-CM

## 2018-12-28 LAB — CYTOLOGY CVX/VAG DOC THIN PREP: NORMAL

## 2018-12-31 ENCOUNTER — APPOINTMENT (OUTPATIENT)
Dept: OBGYN | Facility: CLINIC | Age: 38
End: 2018-12-31
Payer: COMMERCIAL

## 2018-12-31 VITALS
HEIGHT: 62 IN | SYSTOLIC BLOOD PRESSURE: 120 MMHG | BODY MASS INDEX: 32.57 KG/M2 | WEIGHT: 177 LBS | DIASTOLIC BLOOD PRESSURE: 70 MMHG

## 2018-12-31 LAB
BILIRUB UR QL STRIP: NORMAL
GLUCOSE UR-MCNC: 100
HCG UR QL: 0.2 EU/DL
HGB UR QL STRIP.AUTO: NORMAL
KETONES UR-MCNC: NORMAL
LEUKOCYTE ESTERASE UR QL STRIP: NORMAL
NITRITE UR QL STRIP: NORMAL
PH UR STRIP: 7
PROT UR STRIP-MCNC: NORMAL
SP GR UR STRIP: 1.01

## 2018-12-31 PROCEDURE — 81003 URINALYSIS AUTO W/O SCOPE: CPT | Mod: NC,QW

## 2018-12-31 PROCEDURE — 0502F SUBSEQUENT PRENATAL CARE: CPT

## 2018-12-31 PROCEDURE — 76816 OB US FOLLOW-UP PER FETUS: CPT

## 2019-01-15 ENCOUNTER — ASOB RESULT (OUTPATIENT)
Age: 39
End: 2019-01-15

## 2019-01-15 ENCOUNTER — APPOINTMENT (OUTPATIENT)
Dept: ANTEPARTUM | Facility: CLINIC | Age: 39
End: 2019-01-15
Payer: COMMERCIAL

## 2019-01-15 LAB — GLUCOSE 1H P 50 G GLC PO SERPL-MCNC: 118 MG/DL

## 2019-01-15 PROCEDURE — 76811 OB US DETAILED SNGL FETUS: CPT

## 2019-01-15 PROCEDURE — 76817 TRANSVAGINAL US OBSTETRIC: CPT | Mod: 59

## 2019-01-23 ENCOUNTER — RESULT REVIEW (OUTPATIENT)
Age: 39
End: 2019-01-23

## 2019-01-28 ENCOUNTER — APPOINTMENT (OUTPATIENT)
Dept: OBGYN | Facility: CLINIC | Age: 39
End: 2019-01-28
Payer: COMMERCIAL

## 2019-01-28 VITALS
HEIGHT: 62 IN | WEIGHT: 182 LBS | BODY MASS INDEX: 33.49 KG/M2 | DIASTOLIC BLOOD PRESSURE: 70 MMHG | SYSTOLIC BLOOD PRESSURE: 116 MMHG

## 2019-01-28 LAB
BILIRUB UR QL STRIP: NORMAL
COLLECTION METHOD: NORMAL
GLUCOSE UR-MCNC: NORMAL
HCG UR QL: 0.2 EU/DL
HGB UR QL STRIP.AUTO: NORMAL
KETONES UR-MCNC: NORMAL
LEUKOCYTE ESTERASE UR QL STRIP: NORMAL
NITRITE UR QL STRIP: NORMAL
PH UR STRIP: 6.5
PROT UR STRIP-MCNC: NORMAL
SP GR UR STRIP: <=1.005

## 2019-01-28 PROCEDURE — 0502F SUBSEQUENT PRENATAL CARE: CPT

## 2019-02-26 ENCOUNTER — ASOB RESULT (OUTPATIENT)
Age: 39
End: 2019-02-26

## 2019-02-26 ENCOUNTER — APPOINTMENT (OUTPATIENT)
Dept: ANTEPARTUM | Facility: CLINIC | Age: 39
End: 2019-02-26
Payer: COMMERCIAL

## 2019-02-26 PROCEDURE — 76816 OB US FOLLOW-UP PER FETUS: CPT

## 2019-02-27 ENCOUNTER — APPOINTMENT (OUTPATIENT)
Dept: OBGYN | Facility: CLINIC | Age: 39
End: 2019-02-27
Payer: COMMERCIAL

## 2019-02-27 ENCOUNTER — NON-APPOINTMENT (OUTPATIENT)
Age: 39
End: 2019-02-27

## 2019-02-27 VITALS — BODY MASS INDEX: 34.39 KG/M2 | WEIGHT: 188 LBS | DIASTOLIC BLOOD PRESSURE: 66 MMHG | SYSTOLIC BLOOD PRESSURE: 122 MMHG

## 2019-02-27 LAB
BILIRUB UR QL STRIP: NORMAL
GLUCOSE UR-MCNC: NORMAL
HGB UR QL STRIP.AUTO: NORMAL
KETONES UR-MCNC: ABNORMAL
LEUKOCYTE ESTERASE UR QL STRIP: ABNORMAL
NITRITE UR QL STRIP: NORMAL
PROT UR STRIP-MCNC: NORMAL

## 2019-02-27 PROCEDURE — 36415 COLL VENOUS BLD VENIPUNCTURE: CPT

## 2019-02-27 PROCEDURE — 0502F SUBSEQUENT PRENATAL CARE: CPT

## 2019-02-28 LAB
BASOPHILS # BLD AUTO: 0.03 K/UL
BASOPHILS NFR BLD AUTO: 0.2 %
EOSINOPHIL # BLD AUTO: 0.08 K/UL
EOSINOPHIL NFR BLD AUTO: 0.6 %
HCT VFR BLD CALC: 32.2 %
HGB BLD-MCNC: 10.3 G/DL
IMM GRANULOCYTES NFR BLD AUTO: 1.3 %
LYMPHOCYTES # BLD AUTO: 1.76 K/UL
LYMPHOCYTES NFR BLD AUTO: 13.5 %
MAN DIFF?: NORMAL
MCHC RBC-ENTMCNC: 29.8 PG
MCHC RBC-ENTMCNC: 32 GM/DL
MCV RBC AUTO: 93.1 FL
MONOCYTES # BLD AUTO: 0.66 K/UL
MONOCYTES NFR BLD AUTO: 5.1 %
NEUTROPHILS # BLD AUTO: 10.3 K/UL
NEUTROPHILS NFR BLD AUTO: 79.3 %
PLATELET # BLD AUTO: 352 K/UL
RBC # BLD: 3.46 M/UL
RBC # FLD: 13.6 %
WBC # FLD AUTO: 13 K/UL

## 2019-03-08 ENCOUNTER — TRANSCRIPTION ENCOUNTER (OUTPATIENT)
Age: 39
End: 2019-03-08

## 2019-03-08 ENCOUNTER — MESSAGE (OUTPATIENT)
Age: 39
End: 2019-03-08

## 2019-03-08 ENCOUNTER — NON-APPOINTMENT (OUTPATIENT)
Age: 39
End: 2019-03-08

## 2019-03-11 LAB — GLUCOSE 1H P 50 G GLC PO SERPL-MCNC: 137 MG/DL

## 2019-03-13 ENCOUNTER — APPOINTMENT (OUTPATIENT)
Dept: OBGYN | Facility: CLINIC | Age: 39
End: 2019-03-13
Payer: COMMERCIAL

## 2019-03-13 VITALS
SYSTOLIC BLOOD PRESSURE: 118 MMHG | BODY MASS INDEX: 34.23 KG/M2 | HEIGHT: 62 IN | DIASTOLIC BLOOD PRESSURE: 72 MMHG | WEIGHT: 186 LBS

## 2019-03-13 DIAGNOSIS — Z86.32 PERSONAL HISTORY OF GESTATIONAL DIABETES: ICD-10-CM

## 2019-03-13 LAB
BILIRUB UR QL STRIP: NORMAL
COLLECTION METHOD: NORMAL
GLUCOSE UR-MCNC: NORMAL
HCG UR QL: 0.2 EU/DL
HGB UR QL STRIP.AUTO: NORMAL
KETONES UR-MCNC: ABNORMAL
LEUKOCYTE ESTERASE UR QL STRIP: ABNORMAL
NITRITE UR QL STRIP: NORMAL
PH UR STRIP: 7
PROT UR STRIP-MCNC: NORMAL
SP GR UR STRIP: 1.01

## 2019-03-13 PROCEDURE — 0502F SUBSEQUENT PRENATAL CARE: CPT

## 2019-03-14 LAB
BASOPHILS # BLD AUTO: 0.03 K/UL
BASOPHILS NFR BLD AUTO: 0.2 %
EOSINOPHIL # BLD AUTO: 0.08 K/UL
EOSINOPHIL NFR BLD AUTO: 0.6 %
FERRITIN SERPL-MCNC: 25 NG/ML
FOLATE SERPL-MCNC: >20 NG/ML
HCT VFR BLD CALC: 33.6 %
HGB BLD-MCNC: 10.6 G/DL
IMM GRANULOCYTES NFR BLD AUTO: 1.3 %
IRON SATN MFR SERPL: 17 %
IRON SERPL-MCNC: 71 UG/DL
LYMPHOCYTES # BLD AUTO: 1.89 K/UL
LYMPHOCYTES NFR BLD AUTO: 14.2 %
MAN DIFF?: NORMAL
MCHC RBC-ENTMCNC: 29.5 PG
MCHC RBC-ENTMCNC: 31.5 GM/DL
MCV RBC AUTO: 93.6 FL
MONOCYTES # BLD AUTO: 0.65 K/UL
MONOCYTES NFR BLD AUTO: 4.9 %
NEUTROPHILS # BLD AUTO: 10.53 K/UL
NEUTROPHILS NFR BLD AUTO: 78.8 %
PLATELET # BLD AUTO: 316 K/UL
RBC # BLD: 3.59 M/UL
RBC # BLD: 3.59 M/UL
RBC # FLD: 13.4 %
RETICS # AUTO: 1.8 %
RETICS AGGREG/RBC NFR: 63.9 K/UL
TIBC SERPL-MCNC: 430 UG/DL
UIBC SERPL-MCNC: 359 UG/DL
VIT B12 SERPL-MCNC: 203 PG/ML
WBC # FLD AUTO: 13.35 K/UL

## 2019-03-19 ENCOUNTER — APPOINTMENT (OUTPATIENT)
Dept: HEMATOLOGY ONCOLOGY | Facility: CLINIC | Age: 39
End: 2019-03-19
Payer: COMMERCIAL

## 2019-03-19 ENCOUNTER — APPOINTMENT (OUTPATIENT)
Dept: INFUSION THERAPY | Facility: HOSPITAL | Age: 39
End: 2019-03-19

## 2019-03-19 ENCOUNTER — RESULT REVIEW (OUTPATIENT)
Age: 39
End: 2019-03-19

## 2019-03-19 ENCOUNTER — OUTPATIENT (OUTPATIENT)
Dept: OUTPATIENT SERVICES | Facility: HOSPITAL | Age: 39
LOS: 1 days | Discharge: HOME | End: 2019-03-19

## 2019-03-19 VITALS
RESPIRATION RATE: 17 BRPM | HEART RATE: 105 BPM | WEIGHT: 186.07 LBS | HEIGHT: 62.5 IN | DIASTOLIC BLOOD PRESSURE: 82 MMHG | TEMPERATURE: 97.8 F | BODY MASS INDEX: 33.38 KG/M2 | SYSTOLIC BLOOD PRESSURE: 117 MMHG | OXYGEN SATURATION: 100 %

## 2019-03-19 DIAGNOSIS — D64.9 ANEMIA, UNSPECIFIED: ICD-10-CM

## 2019-03-19 DIAGNOSIS — Z98.89 OTHER SPECIFIED POSTPROCEDURAL STATES: Chronic | ICD-10-CM

## 2019-03-19 DIAGNOSIS — Z87.2 PERSONAL HISTORY OF DISEASES OF THE SKIN AND SUBCUTANEOUS TISSUE: Chronic | ICD-10-CM

## 2019-03-19 LAB
BASOPHILS # BLD AUTO: 0 K/UL — SIGNIFICANT CHANGE UP (ref 0–0.2)
BASOPHILS NFR BLD AUTO: 0.1 % — SIGNIFICANT CHANGE UP (ref 0–2)
EOSINOPHIL # BLD AUTO: 0.1 K/UL — SIGNIFICANT CHANGE UP (ref 0–0.5)
EOSINOPHIL NFR BLD AUTO: 0.7 % — SIGNIFICANT CHANGE UP (ref 0–6)
HCT VFR BLD CALC: 29.3 % — LOW (ref 34.5–45)
HGB BLD-MCNC: 10.8 G/DL — LOW (ref 11.5–15.5)
LYMPHOCYTES # BLD AUTO: 14.1 % — SIGNIFICANT CHANGE UP (ref 13–44)
LYMPHOCYTES # BLD AUTO: 2.1 K/UL — SIGNIFICANT CHANGE UP (ref 1–3.3)
MCHC RBC-ENTMCNC: 31.3 PG — SIGNIFICANT CHANGE UP (ref 27–34)
MCHC RBC-ENTMCNC: 36.7 G/DL — HIGH (ref 32–36)
MCV RBC AUTO: 85.3 FL — SIGNIFICANT CHANGE UP (ref 80–100)
MONOCYTES # BLD AUTO: 0.7 K/UL — SIGNIFICANT CHANGE UP (ref 0–0.9)
MONOCYTES NFR BLD AUTO: 5 % — SIGNIFICANT CHANGE UP (ref 2–14)
NEUTROPHILS # BLD AUTO: 11.8 K/UL — HIGH (ref 1.8–7.4)
NEUTROPHILS NFR BLD AUTO: 80.2 % — HIGH (ref 43–77)
PLATELET # BLD AUTO: 332 K/UL — SIGNIFICANT CHANGE UP (ref 150–400)
RBC # BLD: 3.44 M/UL — LOW (ref 3.8–5.2)
RBC # FLD: 11.7 % — SIGNIFICANT CHANGE UP (ref 10.3–14.5)
RETICS #: 59.8 K/UL — SIGNIFICANT CHANGE UP (ref 25–125)
RETICS/RBC NFR: 1.7 % — SIGNIFICANT CHANGE UP (ref 0.5–2.5)
WBC # BLD: 14.7 K/UL — HIGH (ref 3.8–10.5)
WBC # FLD AUTO: 14.7 K/UL — HIGH (ref 3.8–10.5)

## 2019-03-19 PROCEDURE — 99215 OFFICE O/P EST HI 40 MIN: CPT

## 2019-03-19 RX ORDER — ENOXAPARIN SODIUM 40 MG/.4ML
40 INJECTION SUBCUTANEOUS
Refills: 0 | Status: DISCONTINUED | COMMUNITY

## 2019-03-19 RX ORDER — ASCORBIC ACID, CALCIUM CITRATE, IRON, VITAMIN D, DL- ALPHA- TOCOPHEROL ACETATE, THIAMINE, RIBOFLAVIN, NIACINAMIDE, PYRIDOXINE HYDROCHLORIDE, FOLIC ACID, IODINE, ZINC, COPPER, DOCUSATE SODIUM, DOCONEXENT AND ICOSAPENT
KIT
Refills: 0 | Status: DISCONTINUED | COMMUNITY

## 2019-03-19 RX ORDER — EAR PLUGS
EACH OTIC (EAR)
Refills: 0 | Status: DISCONTINUED | COMMUNITY

## 2019-03-19 RX ORDER — DICLOXACILLIN SODIUM 500 MG/1
500 CAPSULE ORAL
Refills: 0 | Status: DISCONTINUED | COMMUNITY
End: 2019-03-19

## 2019-03-19 NOTE — HISTORY OF PRESENT ILLNESS
[de-identified] : This is a 38 year old female who is here for an evaluation of leukocytosis and anemia.  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  She is currently pregnant beginning her third trimester.  She has a history of a missed ab at 8 weeks in May 2013 that required a D&C, February 2014 she has a late fetal loss at 20 weeks due to severe IUGR, on placental pathology there was vascular insufficiency- she underwent a D&E.  She was seen by hematology then and underwent a thrombophilia work up which was negative except for a MTHFR heterozygous mutation, normal homocysteine level.  In August 2014 she had a chemical pregnancy.  In July of 2015, she delivered her first child at 35 weeks via c section.  She was given aspirin and lovenox 40 mg thoughout the pregnancy.  Her pregnancy was complicated by gestational diabetes, then closer to delivery preeclampsia which required an early delivery.  She did breast feed for about 1 year.  In July of 2017, she delivered her second child via c section at 39 weeks.  That pregnancy was uncomplicated, she was treated with prophylactic lovenox and aspirin throughout the pregnancy.  No gestational diabetes or preeclampsia with that delivery.  She also breast fed for about a year post delivery.  She is now pregnant with her 3rd child, due in the end of May.  She had labs completed and was found to have a persistent leukocytosis and slightly worsening anemia.  Her CBC from as far back as 2013 revealed a WBC count that normally ranges from 7-12.  Her labs on 3/13- B12 203, folate >20, iron 71 TIBC 430 %sat 17 ferritin 25.  \par Per her - she is more fatigued, sleeping slightly more.  She denies any lightheadedness, not dizzy, no chest pain, no SOB, no abdominal c/o, no diarrhea, no dysuria, no fever/chills, no sweats.  Her appetite is good.

## 2019-03-19 NOTE — ASSESSMENT
[FreeTextEntry1] : This is a 38 year old female with ITP at the age of 7, history of late pregnancy loss due to placental insufficiency, 2 subsequent pregnancies close apart in 2015 then again in 2017 (first one complicated by gestational diabetes/preeclampsia) now she is pregnant again.  \par \par 1- She has a slight anemia, Hg today is 10.8 with a decreasing MCV.  Labs completed revealed B12 deficiency, along with a borderline low ferritin which suggests iron deficiency as well.  This is likely pregnancy related.  \par Her peripheral smear was reviewed, normal myeloid/lymph cells, normochromic, slightly microcytic red cells, normal platelets.  \par Recommend starting B12 subcutaneous weekly for 4 weeks, then she can begin oral supplementation.  \par She will also start SloFe daily or every other day, discussed GI side effects with iron.  If not tolerated, she should call.  \par \par 2- Leukocytosis- Her peripheral smear is unremarkable.  Suspect this is also pregnancy related.  WBC can be elevated in pregnancy, in 2nd trimester counts can range from 5.6-14.8, 3rd trimester from 5.9-16.9, ANC from 3.8-13.1.  Her WBC today is 14.7 which is not alarming.  She doesn't have any signs of infection, recommend UA, urine culture.  \par She will repeat her labs in 4 weeks.  \par \par All questions/concerns were answered.\par

## 2019-03-20 DIAGNOSIS — D72.829 ELEVATED WHITE BLOOD CELL COUNT, UNSPECIFIED: ICD-10-CM

## 2019-03-20 DIAGNOSIS — D50.9 IRON DEFICIENCY ANEMIA, UNSPECIFIED: ICD-10-CM

## 2019-03-26 ENCOUNTER — ASOB RESULT (OUTPATIENT)
Age: 39
End: 2019-03-26

## 2019-03-26 ENCOUNTER — APPOINTMENT (OUTPATIENT)
Dept: ANTEPARTUM | Facility: CLINIC | Age: 39
End: 2019-03-26
Payer: COMMERCIAL

## 2019-03-26 ENCOUNTER — APPOINTMENT (OUTPATIENT)
Dept: INFUSION THERAPY | Facility: HOSPITAL | Age: 39
End: 2019-03-26

## 2019-03-26 PROCEDURE — 76816 OB US FOLLOW-UP PER FETUS: CPT

## 2019-03-26 PROCEDURE — 76819 FETAL BIOPHYS PROFIL W/O NST: CPT

## 2019-03-27 ENCOUNTER — APPOINTMENT (OUTPATIENT)
Dept: OBGYN | Facility: CLINIC | Age: 39
End: 2019-03-27
Payer: COMMERCIAL

## 2019-03-27 VITALS
DIASTOLIC BLOOD PRESSURE: 80 MMHG | WEIGHT: 188 LBS | SYSTOLIC BLOOD PRESSURE: 130 MMHG | HEIGHT: 62 IN | BODY MASS INDEX: 34.6 KG/M2

## 2019-03-27 DIAGNOSIS — D68.59 OTHER PRIMARY THROMBOPHILIA: ICD-10-CM

## 2019-03-27 LAB
BILIRUB UR QL STRIP: NORMAL
COLLECTION METHOD: NORMAL
GLUCOSE 1H P 100 G GLC PO SERPL-MCNC: 183 MG/DL
GLUCOSE 2H P CHAL SERPL-MCNC: 128 MG/DL
GLUCOSE 3H P CHAL SERPL-MCNC: 120 MG/DL
GLUCOSE BS SERPL-MCNC: 87 MG/DL
GLUCOSE UR-MCNC: NORMAL
HCG UR QL: 0.2 EU/DL
HGB UR QL STRIP.AUTO: NORMAL
KETONES UR-MCNC: NORMAL
LEUKOCYTE ESTERASE UR QL STRIP: NORMAL
NITRITE UR QL STRIP: NORMAL
PH UR STRIP: 7
PROT UR STRIP-MCNC: NORMAL
SP GR UR STRIP: <=1.005

## 2019-03-27 PROCEDURE — 0502F SUBSEQUENT PRENATAL CARE: CPT

## 2019-03-27 PROCEDURE — 36415 COLL VENOUS BLD VENIPUNCTURE: CPT

## 2019-03-30 LAB
ALT SERPL-CCNC: 10 U/L
AST SERPL-CCNC: 15 U/L
BASOPHILS # BLD AUTO: 0.04 K/UL
BASOPHILS NFR BLD AUTO: 0.3 %
BUN SERPL-MCNC: 6 MG/DL
CREAT SERPL-MCNC: 0.42 MG/DL
EOSINOPHIL # BLD AUTO: 0.07 K/UL
EOSINOPHIL NFR BLD AUTO: 0.5 %
HCT VFR BLD CALC: 33.1 %
HGB BLD-MCNC: 10.5 G/DL
IMM GRANULOCYTES NFR BLD AUTO: 1.3 %
LDH SERPL-CCNC: 206 U/L
LYMPHOCYTES # BLD AUTO: 1.74 K/UL
LYMPHOCYTES NFR BLD AUTO: 13.1 %
MAN DIFF?: NORMAL
MCHC RBC-ENTMCNC: 29.3 PG
MCHC RBC-ENTMCNC: 31.7 GM/DL
MCV RBC AUTO: 92.5 FL
MONOCYTES # BLD AUTO: 0.76 K/UL
MONOCYTES NFR BLD AUTO: 5.7 %
NEUTROPHILS # BLD AUTO: 10.53 K/UL
NEUTROPHILS NFR BLD AUTO: 79.1 %
PLATELET # BLD AUTO: 320 K/UL
RBC # BLD: 3.58 M/UL
RBC # FLD: 13.6 %
URATE SERPL-MCNC: 2.9 MG/DL
WBC # FLD AUTO: 13.31 K/UL

## 2019-04-01 ENCOUNTER — APPOINTMENT (OUTPATIENT)
Dept: INFUSION THERAPY | Facility: HOSPITAL | Age: 39
End: 2019-04-01

## 2019-04-03 DIAGNOSIS — E53.8 DEFICIENCY OF OTHER SPECIFIED B GROUP VITAMINS: ICD-10-CM

## 2019-04-03 LAB
CREAT 24H UR-MCNC: 0.6 G/24 H
CREAT 24H UR-MCNC: 0.6 G/24 H
CREAT ?TM UR-MCNC: 32 MG/DL
CREAT ?TM UR-MCNC: 32 MG/DL
PROT 24H UR-MRATE: 6 MG/DL
PROT ?TM UR-MCNC: 24 HR
PROT ?TM UR-MCNC: 24 HR
PROT UR-MCNC: 111 MG/24 H
SPECIMEN VOL 24H UR: 1850 ML
SPECIMEN VOL 24H UR: 1850 ML

## 2019-04-07 ENCOUNTER — TRANSCRIPTION ENCOUNTER (OUTPATIENT)
Age: 39
End: 2019-04-07

## 2019-04-11 ENCOUNTER — APPOINTMENT (OUTPATIENT)
Dept: OBGYN | Facility: CLINIC | Age: 39
End: 2019-04-11
Payer: COMMERCIAL

## 2019-04-11 VITALS
BODY MASS INDEX: 34.78 KG/M2 | SYSTOLIC BLOOD PRESSURE: 116 MMHG | DIASTOLIC BLOOD PRESSURE: 70 MMHG | WEIGHT: 189 LBS | HEIGHT: 62 IN

## 2019-04-11 PROCEDURE — 0502F SUBSEQUENT PRENATAL CARE: CPT

## 2019-04-12 ENCOUNTER — APPOINTMENT (OUTPATIENT)
Dept: INFUSION THERAPY | Facility: HOSPITAL | Age: 39
End: 2019-04-12

## 2019-04-29 ENCOUNTER — NON-APPOINTMENT (OUTPATIENT)
Age: 39
End: 2019-04-29

## 2019-04-29 ENCOUNTER — APPOINTMENT (OUTPATIENT)
Dept: OBGYN | Facility: CLINIC | Age: 39
End: 2019-04-29
Payer: COMMERCIAL

## 2019-04-29 VITALS
WEIGHT: 189 LBS | BODY MASS INDEX: 34.78 KG/M2 | HEIGHT: 62 IN | SYSTOLIC BLOOD PRESSURE: 110 MMHG | DIASTOLIC BLOOD PRESSURE: 76 MMHG

## 2019-04-29 LAB
BILIRUB UR QL STRIP: NORMAL
BILIRUB UR QL STRIP: NORMAL
GLUCOSE UR-MCNC: NORMAL
GLUCOSE UR-MCNC: NORMAL
HCG UR QL: 0.2 EU/DL
HGB UR QL STRIP.AUTO: NORMAL
HGB UR QL STRIP.AUTO: NORMAL
KETONES UR-MCNC: NORMAL
KETONES UR-MCNC: NORMAL
LEUKOCYTE ESTERASE UR QL STRIP: NORMAL
LEUKOCYTE ESTERASE UR QL STRIP: NORMAL
NITRITE UR QL STRIP: NORMAL
NITRITE UR QL STRIP: NORMAL
PH UR STRIP: 5.5
PROT UR STRIP-MCNC: NORMAL
PROT UR STRIP-MCNC: NORMAL
SP GR UR STRIP: <=1.005

## 2019-04-29 PROCEDURE — 36415 COLL VENOUS BLD VENIPUNCTURE: CPT

## 2019-04-29 PROCEDURE — 90715 TDAP VACCINE 7 YRS/> IM: CPT

## 2019-04-29 PROCEDURE — 0502F SUBSEQUENT PRENATAL CARE: CPT

## 2019-04-29 PROCEDURE — 90471 IMMUNIZATION ADMIN: CPT

## 2019-04-30 ENCOUNTER — APPOINTMENT (OUTPATIENT)
Dept: ANTEPARTUM | Facility: CLINIC | Age: 39
End: 2019-04-30
Payer: COMMERCIAL

## 2019-04-30 ENCOUNTER — NON-APPOINTMENT (OUTPATIENT)
Age: 39
End: 2019-04-30

## 2019-04-30 ENCOUNTER — ASOB RESULT (OUTPATIENT)
Age: 39
End: 2019-04-30

## 2019-04-30 PROCEDURE — 76816 OB US FOLLOW-UP PER FETUS: CPT

## 2019-04-30 RX ORDER — HEPARIN SODIUM 5000 [USP'U]/.5ML
5000 INJECTION, SOLUTION INTRAVENOUS; SUBCUTANEOUS TWICE DAILY
Qty: 60 | Refills: 1 | Status: COMPLETED | COMMUNITY
Start: 2019-04-30

## 2019-05-02 ENCOUNTER — NON-APPOINTMENT (OUTPATIENT)
Age: 39
End: 2019-05-02

## 2019-05-02 LAB — B-HEM STREP SPEC QL CULT: ABNORMAL

## 2019-05-08 ENCOUNTER — APPOINTMENT (OUTPATIENT)
Dept: OBGYN | Facility: CLINIC | Age: 39
End: 2019-05-08
Payer: COMMERCIAL

## 2019-05-08 VITALS — WEIGHT: 189 LBS | SYSTOLIC BLOOD PRESSURE: 102 MMHG | BODY MASS INDEX: 34.57 KG/M2 | DIASTOLIC BLOOD PRESSURE: 64 MMHG

## 2019-05-08 LAB
BILIRUB UR QL STRIP: NORMAL
GLUCOSE UR-MCNC: NORMAL
HCG UR QL: 0.2 EU/DL
HGB UR QL STRIP.AUTO: NORMAL
KETONES UR-MCNC: ABNORMAL
LEUKOCYTE ESTERASE UR QL STRIP: ABNORMAL
NITRITE UR QL STRIP: NORMAL
PH UR STRIP: 7
PROT UR STRIP-MCNC: NORMAL
SP GR UR STRIP: 1.01

## 2019-05-08 PROCEDURE — 76818 FETAL BIOPHYS PROFILE W/NST: CPT

## 2019-05-13 ENCOUNTER — OUTPATIENT (OUTPATIENT)
Dept: OUTPATIENT SERVICES | Facility: HOSPITAL | Age: 39
LOS: 1 days | End: 2019-05-13
Payer: COMMERCIAL

## 2019-05-13 DIAGNOSIS — Z98.89 OTHER SPECIFIED POSTPROCEDURAL STATES: Chronic | ICD-10-CM

## 2019-05-13 DIAGNOSIS — Z87.2 PERSONAL HISTORY OF DISEASES OF THE SKIN AND SUBCUTANEOUS TISSUE: Chronic | ICD-10-CM

## 2019-05-13 DIAGNOSIS — Z01.818 ENCOUNTER FOR OTHER PREPROCEDURAL EXAMINATION: ICD-10-CM

## 2019-05-13 LAB
BLD GP AB SCN SERPL QL: SIGNIFICANT CHANGE UP
MEV IGG SER-ACNC: 146 AU/ML — SIGNIFICANT CHANGE UP
MEV IGG+IGM SER-IMP: POSITIVE — SIGNIFICANT CHANGE UP
T PALLIDUM AB TITR SER: NEGATIVE — SIGNIFICANT CHANGE UP
TYPE + AB SCN PNL BLD: SIGNIFICANT CHANGE UP

## 2019-05-13 PROCEDURE — 86765 RUBEOLA ANTIBODY: CPT

## 2019-05-13 PROCEDURE — 86901 BLOOD TYPING SEROLOGIC RH(D): CPT

## 2019-05-13 PROCEDURE — 86850 RBC ANTIBODY SCREEN: CPT

## 2019-05-13 PROCEDURE — 86780 TREPONEMA PALLIDUM: CPT

## 2019-05-13 PROCEDURE — 86900 BLOOD TYPING SEROLOGIC ABO: CPT

## 2019-05-13 PROCEDURE — 36415 COLL VENOUS BLD VENIPUNCTURE: CPT

## 2019-05-16 ENCOUNTER — APPOINTMENT (OUTPATIENT)
Dept: OBGYN | Facility: CLINIC | Age: 39
End: 2019-05-16
Payer: COMMERCIAL

## 2019-05-16 VITALS
SYSTOLIC BLOOD PRESSURE: 135 MMHG | BODY MASS INDEX: 35.15 KG/M2 | DIASTOLIC BLOOD PRESSURE: 77 MMHG | WEIGHT: 191 LBS | HEIGHT: 62 IN

## 2019-05-16 PROCEDURE — 76818 FETAL BIOPHYS PROFILE W/NST: CPT

## 2019-05-19 ENCOUNTER — TRANSCRIPTION ENCOUNTER (OUTPATIENT)
Age: 39
End: 2019-05-19

## 2019-05-20 ENCOUNTER — RESULT REVIEW (OUTPATIENT)
Age: 39
End: 2019-05-20

## 2019-05-20 ENCOUNTER — APPOINTMENT (OUTPATIENT)
Dept: OBGYN | Facility: HOSPITAL | Age: 39
End: 2019-05-20

## 2019-05-20 ENCOUNTER — INPATIENT (INPATIENT)
Facility: HOSPITAL | Age: 39
LOS: 2 days | Discharge: HOME | End: 2019-05-23
Attending: SPECIALIST | Admitting: SPECIALIST
Payer: COMMERCIAL

## 2019-05-20 VITALS
OXYGEN SATURATION: 100 % | WEIGHT: 190.04 LBS | HEIGHT: 62 IN | TEMPERATURE: 97 F | HEART RATE: 91 BPM | RESPIRATION RATE: 18 BRPM | SYSTOLIC BLOOD PRESSURE: 134 MMHG | DIASTOLIC BLOOD PRESSURE: 79 MMHG

## 2019-05-20 DIAGNOSIS — Z87.2 PERSONAL HISTORY OF DISEASES OF THE SKIN AND SUBCUTANEOUS TISSUE: Chronic | ICD-10-CM

## 2019-05-20 DIAGNOSIS — Z98.89 OTHER SPECIFIED POSTPROCEDURAL STATES: Chronic | ICD-10-CM

## 2019-05-20 DIAGNOSIS — O34.219 MATERNAL CARE FOR UNSPECIFIED TYPE SCAR FROM PREVIOUS CESAREAN DELIVERY: ICD-10-CM

## 2019-05-20 LAB
APPEARANCE UR: CLEAR — SIGNIFICANT CHANGE UP
APTT BLD: 26.6 SEC — LOW (ref 27.5–36.3)
BACTERIA # UR AUTO: NEGATIVE — SIGNIFICANT CHANGE UP
BASOPHILS # BLD AUTO: 0 K/UL — SIGNIFICANT CHANGE UP (ref 0–0.2)
BASOPHILS NFR BLD AUTO: 0.2 % — SIGNIFICANT CHANGE UP (ref 0–2)
BILIRUB UR-MCNC: NEGATIVE — SIGNIFICANT CHANGE UP
BLD GP AB SCN SERPL QL: SIGNIFICANT CHANGE UP
COLOR SPEC: YELLOW — SIGNIFICANT CHANGE UP
DIFF PNL FLD: ABNORMAL
EOSINOPHIL # BLD AUTO: 0.1 K/UL — SIGNIFICANT CHANGE UP (ref 0–0.5)
EOSINOPHIL NFR BLD AUTO: 0.6 % — SIGNIFICANT CHANGE UP (ref 0–6)
EPI CELLS # UR: SIGNIFICANT CHANGE UP
GLUCOSE UR QL: NEGATIVE MG/DL — SIGNIFICANT CHANGE UP
HCT VFR BLD CALC: 35.8 % — LOW (ref 37–47)
HGB BLD-MCNC: 12.1 G/DL — SIGNIFICANT CHANGE UP (ref 12–16)
HIV 1 & 2 AB SERPL IA.RAPID: SIGNIFICANT CHANGE UP
HIV 1+2 AB+HIV1 P24 AG SERPL QL IA: SIGNIFICANT CHANGE UP
INR BLD: 0.89 RATIO — SIGNIFICANT CHANGE UP (ref 0.88–1.16)
KETONES UR-MCNC: NEGATIVE — SIGNIFICANT CHANGE UP
LEUKOCYTE ESTERASE UR-ACNC: ABNORMAL
LYMPHOCYTES # BLD AUTO: 17.6 % — LOW (ref 20–55)
LYMPHOCYTES # BLD AUTO: 2.2 K/UL — SIGNIFICANT CHANGE UP (ref 1–4.8)
MCHC RBC-ENTMCNC: 29.2 PG — SIGNIFICANT CHANGE UP (ref 27–31)
MCHC RBC-ENTMCNC: 33.8 G/DL — SIGNIFICANT CHANGE UP (ref 32–36)
MCV RBC AUTO: 86.3 FL — SIGNIFICANT CHANGE UP (ref 81–99)
MONOCYTES # BLD AUTO: 0.9 K/UL — HIGH (ref 0–0.8)
MONOCYTES NFR BLD AUTO: 6.7 % — SIGNIFICANT CHANGE UP (ref 3–10)
NEUTROPHILS # BLD AUTO: 9.4 K/UL — HIGH (ref 1.8–8)
NEUTROPHILS NFR BLD AUTO: 73.6 % — HIGH (ref 37–73)
NITRITE UR-MCNC: NEGATIVE — SIGNIFICANT CHANGE UP
PH UR: 7 — SIGNIFICANT CHANGE UP (ref 5–8)
PLATELET # BLD AUTO: 316 K/UL — SIGNIFICANT CHANGE UP (ref 150–400)
PROT UR-MCNC: NEGATIVE MG/DL — SIGNIFICANT CHANGE UP
PROTHROM AB SERPL-ACNC: 10.2 SEC — SIGNIFICANT CHANGE UP (ref 10–12.9)
RBC # BLD: 4.15 M/UL — LOW (ref 4.4–5.2)
RBC # FLD: 14.4 % — SIGNIFICANT CHANGE UP (ref 11–15.6)
RBC CASTS # UR COMP ASSIST: SIGNIFICANT CHANGE UP /HPF (ref 0–4)
SP GR SPEC: 1.01 — SIGNIFICANT CHANGE UP (ref 1.01–1.02)
TYPE + AB SCN PNL BLD: SIGNIFICANT CHANGE UP
UROBILINOGEN FLD QL: NEGATIVE MG/DL — SIGNIFICANT CHANGE UP
WBC # BLD: 12.8 K/UL — HIGH (ref 4.8–10.8)
WBC # FLD AUTO: 12.8 K/UL — HIGH (ref 4.8–10.8)
WBC UR QL: SIGNIFICANT CHANGE UP

## 2019-05-20 PROCEDURE — 14001 TIS TRNFR TRUNK 10.1-30SQCM: CPT | Mod: 59

## 2019-05-20 PROCEDURE — 58700 REMOVAL OF FALLOPIAN TUBE: CPT | Mod: 59

## 2019-05-20 PROCEDURE — 59510 CESAREAN DELIVERY: CPT

## 2019-05-20 PROCEDURE — 59514 CESAREAN DELIVERY ONLY: CPT | Mod: 80

## 2019-05-20 PROCEDURE — 88302 TISSUE EXAM BY PATHOLOGIST: CPT | Mod: 26

## 2019-05-20 PROCEDURE — 88304 TISSUE EXAM BY PATHOLOGIST: CPT | Mod: 26

## 2019-05-20 RX ORDER — TETANUS TOXOID, REDUCED DIPHTHERIA TOXOID AND ACELLULAR PERTUSSIS VACCINE, ADSORBED 5; 2.5; 8; 8; 2.5 [IU]/.5ML; [IU]/.5ML; UG/.5ML; UG/.5ML; UG/.5ML
0.5 SUSPENSION INTRAMUSCULAR ONCE
Refills: 0 | Status: DISCONTINUED | OUTPATIENT
Start: 2019-05-20 | End: 2019-05-23

## 2019-05-20 RX ORDER — SODIUM CHLORIDE 9 MG/ML
1000 INJECTION, SOLUTION INTRAVENOUS
Refills: 0 | Status: DISCONTINUED | OUTPATIENT
Start: 2019-05-20 | End: 2019-05-23

## 2019-05-20 RX ORDER — OXYCODONE HYDROCHLORIDE 5 MG/1
5 TABLET ORAL
Refills: 0 | Status: DISCONTINUED | OUTPATIENT
Start: 2019-05-20 | End: 2019-05-23

## 2019-05-20 RX ORDER — METOCLOPRAMIDE HCL 10 MG
10 TABLET ORAL ONCE
Refills: 0 | Status: DISCONTINUED | OUTPATIENT
Start: 2019-05-20 | End: 2019-05-23

## 2019-05-20 RX ORDER — KETOROLAC TROMETHAMINE 30 MG/ML
30 SYRINGE (ML) INJECTION EVERY 6 HOURS
Refills: 0 | Status: DISCONTINUED | OUTPATIENT
Start: 2019-05-20 | End: 2019-05-21

## 2019-05-20 RX ORDER — CITRIC ACID/SODIUM CITRATE 300-500 MG
30 SOLUTION, ORAL ORAL ONCE
Refills: 0 | Status: COMPLETED | OUTPATIENT
Start: 2019-05-20 | End: 2019-05-20

## 2019-05-20 RX ORDER — MAGNESIUM HYDROXIDE 400 MG/1
30 TABLET, CHEWABLE ORAL
Refills: 0 | Status: DISCONTINUED | OUTPATIENT
Start: 2019-05-20 | End: 2019-05-23

## 2019-05-20 RX ORDER — SODIUM CHLORIDE 9 MG/ML
1000 INJECTION, SOLUTION INTRAVENOUS ONCE
Refills: 0 | Status: COMPLETED | OUTPATIENT
Start: 2019-05-20 | End: 2019-05-20

## 2019-05-20 RX ORDER — CEFAZOLIN SODIUM 1 G
2000 VIAL (EA) INJECTION ONCE
Refills: 0 | Status: COMPLETED | OUTPATIENT
Start: 2019-05-20 | End: 2019-05-20

## 2019-05-20 RX ORDER — METOCLOPRAMIDE HCL 10 MG
10 TABLET ORAL ONCE
Refills: 0 | Status: DISCONTINUED | OUTPATIENT
Start: 2019-05-20 | End: 2019-05-20

## 2019-05-20 RX ORDER — CITRIC ACID/SODIUM CITRATE 300-500 MG
30 SOLUTION, ORAL ORAL ONCE
Refills: 0 | Status: DISCONTINUED | OUTPATIENT
Start: 2019-05-20 | End: 2019-05-23

## 2019-05-20 RX ORDER — DOCUSATE SODIUM 100 MG
100 CAPSULE ORAL
Refills: 0 | Status: DISCONTINUED | OUTPATIENT
Start: 2019-05-20 | End: 2019-05-23

## 2019-05-20 RX ORDER — LANOLIN
1 OINTMENT (GRAM) TOPICAL EVERY 6 HOURS
Refills: 0 | Status: DISCONTINUED | OUTPATIENT
Start: 2019-05-20 | End: 2019-05-23

## 2019-05-20 RX ORDER — FAMOTIDINE 10 MG/ML
20 INJECTION INTRAVENOUS ONCE
Refills: 0 | Status: DISCONTINUED | OUTPATIENT
Start: 2019-05-20 | End: 2019-05-20

## 2019-05-20 RX ORDER — HYDROMORPHONE HYDROCHLORIDE 2 MG/ML
1 INJECTION INTRAMUSCULAR; INTRAVENOUS; SUBCUTANEOUS
Refills: 0 | Status: DISCONTINUED | OUTPATIENT
Start: 2019-05-20 | End: 2019-05-23

## 2019-05-20 RX ORDER — SODIUM CHLORIDE 9 MG/ML
1000 INJECTION, SOLUTION INTRAVENOUS ONCE
Refills: 0 | Status: DISCONTINUED | OUTPATIENT
Start: 2019-05-20 | End: 2019-05-20

## 2019-05-20 RX ORDER — OXYTOCIN 10 UNIT/ML
333.33 VIAL (ML) INJECTION
Qty: 20 | Refills: 0 | Status: DISCONTINUED | OUTPATIENT
Start: 2019-05-20 | End: 2019-05-23

## 2019-05-20 RX ORDER — NALOXONE HYDROCHLORIDE 4 MG/.1ML
0.1 SPRAY NASAL
Refills: 0 | Status: DISCONTINUED | OUTPATIENT
Start: 2019-05-20 | End: 2019-05-23

## 2019-05-20 RX ORDER — ONDANSETRON 8 MG/1
4 TABLET, FILM COATED ORAL EVERY 6 HOURS
Refills: 0 | Status: DISCONTINUED | OUTPATIENT
Start: 2019-05-20 | End: 2019-05-23

## 2019-05-20 RX ORDER — DIPHENHYDRAMINE HCL 50 MG
25 CAPSULE ORAL EVERY 4 HOURS
Refills: 0 | Status: DISCONTINUED | OUTPATIENT
Start: 2019-05-20 | End: 2019-05-23

## 2019-05-20 RX ORDER — SIMETHICONE 80 MG/1
80 TABLET, CHEWABLE ORAL EVERY 4 HOURS
Refills: 0 | Status: DISCONTINUED | OUTPATIENT
Start: 2019-05-20 | End: 2019-05-23

## 2019-05-20 RX ORDER — GLYCERIN ADULT
1 SUPPOSITORY, RECTAL RECTAL AT BEDTIME
Refills: 0 | Status: DISCONTINUED | OUTPATIENT
Start: 2019-05-20 | End: 2019-05-23

## 2019-05-20 RX ORDER — OXYTOCIN 10 UNIT/ML
333.33 VIAL (ML) INJECTION
Qty: 20 | Refills: 0 | Status: COMPLETED | OUTPATIENT
Start: 2019-05-20 | End: 2019-05-20

## 2019-05-20 RX ORDER — TRANEXAMIC ACID 100 MG/ML
1000 INJECTION, SOLUTION INTRAVENOUS ONCE
Refills: 0 | Status: COMPLETED | OUTPATIENT
Start: 2019-05-20 | End: 2019-05-20

## 2019-05-20 RX ORDER — SODIUM CHLORIDE 9 MG/ML
1000 INJECTION, SOLUTION INTRAVENOUS
Refills: 0 | Status: DISCONTINUED | OUTPATIENT
Start: 2019-05-20 | End: 2019-05-20

## 2019-05-20 RX ORDER — HEPARIN SODIUM 5000 [USP'U]/ML
5000 INJECTION INTRAVENOUS; SUBCUTANEOUS EVERY 12 HOURS
Refills: 0 | Status: DISCONTINUED | OUTPATIENT
Start: 2019-05-20 | End: 2019-05-23

## 2019-05-20 RX ORDER — DIPHENHYDRAMINE HCL 50 MG
25 CAPSULE ORAL EVERY 6 HOURS
Refills: 0 | Status: DISCONTINUED | OUTPATIENT
Start: 2019-05-20 | End: 2019-05-23

## 2019-05-20 RX ORDER — OXYCODONE HYDROCHLORIDE 5 MG/1
10 TABLET ORAL
Refills: 0 | Status: DISCONTINUED | OUTPATIENT
Start: 2019-05-20 | End: 2019-05-23

## 2019-05-20 RX ORDER — ACETAMINOPHEN 500 MG
1000 TABLET ORAL ONCE
Refills: 0 | Status: COMPLETED | OUTPATIENT
Start: 2019-05-20 | End: 2019-05-20

## 2019-05-20 RX ADMIN — Medication 1000 MILLIUNIT(S)/MIN: at 10:47

## 2019-05-20 RX ADMIN — SODIUM CHLORIDE 2000 MILLILITER(S): 9 INJECTION, SOLUTION INTRAVENOUS at 07:50

## 2019-05-20 RX ADMIN — Medication 400 MILLIGRAM(S): at 13:30

## 2019-05-20 RX ADMIN — Medication 30 MILLILITER(S): at 09:23

## 2019-05-20 RX ADMIN — SODIUM CHLORIDE 125 MILLILITER(S): 9 INJECTION, SOLUTION INTRAVENOUS at 10:05

## 2019-05-20 RX ADMIN — Medication 100 MILLIGRAM(S): at 10:17

## 2019-05-20 RX ADMIN — HEPARIN SODIUM 5000 UNIT(S): 5000 INJECTION INTRAVENOUS; SUBCUTANEOUS at 22:11

## 2019-05-20 RX ADMIN — TRANEXAMIC ACID 220 MILLIGRAM(S): 100 INJECTION, SOLUTION INTRAVENOUS at 09:55

## 2019-05-20 RX ADMIN — Medication 30 MILLIGRAM(S): at 14:00

## 2019-05-20 RX ADMIN — SODIUM CHLORIDE 125 MILLILITER(S): 9 INJECTION, SOLUTION INTRAVENOUS at 08:33

## 2019-05-20 RX ADMIN — Medication 30 MILLIGRAM(S): at 22:00

## 2019-05-20 RX ADMIN — Medication 30 MILLIGRAM(S): at 22:15

## 2019-05-20 RX ADMIN — Medication 1000 MILLIUNIT(S)/MIN: at 13:31

## 2019-05-20 NOTE — OB RN PATIENT PROFILE - PSH
Born by  section  ,   History of cyst of breast  right 1996  History of dilation and curettage  2013  History of ovarian cystectomy

## 2019-05-20 NOTE — OB PROVIDER H&P - NSHPPHYSICALEXAM_GEN_ALL_CORE
Vital Signs Last 24 Hrs  T(C): 36.3 (20 May 2019 07:51), Max: 36.3 (20 May 2019 07:51)  T(F): 97.3 (20 May 2019 07:51), Max: 97.3 (20 May 2019 07:51)  HR: 87 (20 May 2019 08:23) (87 - 91)  BP: 134/79 (20 May 2019 07:53) (134/79 - 134/79)  BP(mean): --  RR: 18 (20 May 2019 07:51) (18 - 18)  SpO2: 100% (20 May 2019 08:23) (100% - 100%)

## 2019-05-20 NOTE — OB RN INTRAOPERATIVE NOTE - NS_ADDITIONALPERSONNEL_OBGYN_ALL_OB_FT
Bteadine  vaginal prep and botello # 16   done    by  Dr Ferrara Betadine  vaginal prep and botello # 16   done    by  Dr Ferrara

## 2019-05-20 NOTE — OB PROVIDER H&P - ASSESSMENT
36 y/o  at 39w0d admitted for repeat  section at term.   - consent  - admission labs  - periop antibiotics     d/w Dr. Jaeger

## 2019-05-20 NOTE — OB PROVIDER DELIVERY SUMMARY - NSPROVIDERDELIVERYNOTE_OBGYN_ALL_OB_FT
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 [employer] stem sell collection.   Vigorous Female baby, vertex, clear AF, cord segment for pH and bloods, PDMI, 3VC, normal appearing ovaries, and tubes slightly adherent. Omental adhesions taken down and excised.  Apgars 9/9, weight 7#5oz.

## 2019-05-20 NOTE — OB PROVIDER H&P - HISTORY OF PRESENT ILLNESS
39 y/o  at 39w0d presenting for scheduled repeat  section at term. Denies any vaginal bleeding, leakage of fluid, and lower abdominal pain/cramping. +FM   Prenatal course uncomplicated.     ObGynHx: SAB x3, C/S x2 (, ; history of GDM and Pre-eclampsia)   PMH/PSH: right ovarian cystectomy , D&C , right breast cystectomy   Meds: PNV  Allergies: NKDA

## 2019-05-20 NOTE — OB PROVIDER H&P - ATTENDING COMMENTS
I have reviewed the history and plan with Hayley and Kristofer. we will proceed with an excision and revision of scar fibrosis and a repeat LST  section and then bilateral salpingectomies for sterilization. All questions were answered and we will be doing a [employer] Stem cell collection.

## 2019-05-20 NOTE — OB PROVIDER H&P - PRO HIV INFANT
Airway patent, Nasal mucosa clear. Mouth with dry mucosa. Throat has no vesicles, no oropharyngeal exudates and uvula is midline. unknown

## 2019-05-20 NOTE — OB NEONATOLOGY/PEDIATRICIAN DELIVERY SUMMARY - NSPEDSNEONOTESA_OBGYN_ALL_OB_FT
Requested by Dr Jaeger to attend a scheduled RC/S of a 39 y/o  mom at 39 weeks GA.  She had + PNC, is O pos, HBSAg neg, HIV neg, RPR NR, Rubella Immune, GBS neg.  L&D:  AROM at delivery.  Baby born vertex with good cry, transferred to warmer, orally suctioned, dried, and examined. Baby showed to father and then transferred to mom for STS.  Will transition to regular Nursery under PMD care. Requested by Dr Jaeger to attend a scheduled RC/S of a 37 y/o  mom at 39 weeks GA.  She had + PNC, is O pos, HBSAg neg, HIV neg, RPR NR, Rubella Immune, GBS neg.  L&D:  AROM at delivery.  Baby born vertex with good cry, transferred to warmer, orally suctioned, dried, and examined. Baby showed to father and then transferred to mom for STS.  Will transition to Regular Nursery under PMD care.

## 2019-05-21 LAB
BASOPHILS # BLD AUTO: 0 K/UL — SIGNIFICANT CHANGE UP (ref 0–0.2)
BASOPHILS NFR BLD AUTO: 0.2 % — SIGNIFICANT CHANGE UP (ref 0–2)
EOSINOPHIL # BLD AUTO: 0.2 K/UL — SIGNIFICANT CHANGE UP (ref 0–0.5)
EOSINOPHIL NFR BLD AUTO: 1.2 % — SIGNIFICANT CHANGE UP (ref 0–6)
HCT VFR BLD CALC: 30.6 % — LOW (ref 37–47)
HGB BLD-MCNC: 10.2 G/DL — LOW (ref 12–16)
LYMPHOCYTES # BLD AUTO: 1.5 K/UL — SIGNIFICANT CHANGE UP (ref 1–4.8)
LYMPHOCYTES # BLD AUTO: 11.3 % — LOW (ref 20–55)
MCHC RBC-ENTMCNC: 29.1 PG — SIGNIFICANT CHANGE UP (ref 27–31)
MCHC RBC-ENTMCNC: 33.3 G/DL — SIGNIFICANT CHANGE UP (ref 32–36)
MCV RBC AUTO: 87.4 FL — SIGNIFICANT CHANGE UP (ref 81–99)
MEV IGG SER-ACNC: 128 AU/ML — SIGNIFICANT CHANGE UP
MEV IGG+IGM SER-IMP: POSITIVE — SIGNIFICANT CHANGE UP
MONOCYTES # BLD AUTO: 0.8 K/UL — SIGNIFICANT CHANGE UP (ref 0–0.8)
MONOCYTES NFR BLD AUTO: 5.8 % — SIGNIFICANT CHANGE UP (ref 3–10)
NEUTROPHILS # BLD AUTO: 10.4 K/UL — HIGH (ref 1.8–8)
NEUTROPHILS NFR BLD AUTO: 80.8 % — HIGH (ref 37–73)
PLATELET # BLD AUTO: 237 K/UL — SIGNIFICANT CHANGE UP (ref 150–400)
RBC # BLD: 3.5 M/UL — LOW (ref 4.4–5.2)
RBC # FLD: 14.6 % — SIGNIFICANT CHANGE UP (ref 11–15.6)
T PALLIDUM AB TITR SER: NEGATIVE — SIGNIFICANT CHANGE UP
WBC # BLD: 12.9 K/UL — HIGH (ref 4.8–10.8)
WBC # FLD AUTO: 12.9 K/UL — HIGH (ref 4.8–10.8)

## 2019-05-21 RX ORDER — OXYCODONE HYDROCHLORIDE 5 MG/1
5 TABLET ORAL
Refills: 0 | Status: DISCONTINUED | OUTPATIENT
Start: 2019-05-21 | End: 2019-05-23

## 2019-05-21 RX ORDER — ACETAMINOPHEN 500 MG
975 TABLET ORAL EVERY 6 HOURS
Refills: 0 | Status: DISCONTINUED | OUTPATIENT
Start: 2019-05-21 | End: 2019-05-23

## 2019-05-21 RX ORDER — OXYCODONE HYDROCHLORIDE 5 MG/1
5 TABLET ORAL ONCE
Refills: 0 | Status: DISCONTINUED | OUTPATIENT
Start: 2019-05-21 | End: 2019-05-23

## 2019-05-21 RX ORDER — IBUPROFEN 200 MG
600 TABLET ORAL EVERY 6 HOURS
Refills: 0 | Status: COMPLETED | OUTPATIENT
Start: 2019-05-21 | End: 2020-04-18

## 2019-05-21 RX ORDER — IBUPROFEN 200 MG
600 TABLET ORAL EVERY 6 HOURS
Refills: 0 | Status: DISCONTINUED | OUTPATIENT
Start: 2019-05-21 | End: 2019-05-23

## 2019-05-21 RX ADMIN — Medication 600 MILLIGRAM(S): at 17:00

## 2019-05-21 RX ADMIN — HEPARIN SODIUM 5000 UNIT(S): 5000 INJECTION INTRAVENOUS; SUBCUTANEOUS at 21:20

## 2019-05-21 RX ADMIN — Medication 30 MILLIGRAM(S): at 06:25

## 2019-05-21 RX ADMIN — Medication 600 MILLIGRAM(S): at 21:20

## 2019-05-21 RX ADMIN — Medication 30 MILLIGRAM(S): at 11:45

## 2019-05-21 RX ADMIN — Medication 30 MILLIGRAM(S): at 11:30

## 2019-05-21 RX ADMIN — Medication 975 MILLIGRAM(S): at 19:15

## 2019-05-21 RX ADMIN — Medication 975 MILLIGRAM(S): at 18:21

## 2019-05-21 RX ADMIN — Medication 600 MILLIGRAM(S): at 16:10

## 2019-05-21 RX ADMIN — Medication 600 MILLIGRAM(S): at 22:00

## 2019-05-21 RX ADMIN — HEPARIN SODIUM 5000 UNIT(S): 5000 INJECTION INTRAVENOUS; SUBCUTANEOUS at 11:11

## 2019-05-21 RX ADMIN — Medication 30 MILLIGRAM(S): at 06:10

## 2019-05-21 NOTE — PROGRESS NOTE ADULT - SUBJECTIVE AND OBJECTIVE BOX
38y year old  now POD#1 s/p  section at 39wks gestation.     No acute overnight events. Pain well controlled.  Patient is ambulating, +voiding, +Flatus, -BM  Reports minimal lochia.   +breast feeding, -breast tenderness    VS:   Vital Signs Last 24 Hrs  T(C): 36.8 (21 May 2019 00:15), Max: 36.9 (20 May 2019 15:03)  T(F): 98.3 (21 May 2019 00:15), Max: 98.5 (20 May 2019 15:03)  HR: 85 (21 May 2019 00:15) (59 - 91)  BP: 106/67 (21 May 2019 00:15) (99/62 - 134/79)  RR: 18 (21 May 2019 00:15) (14 - 20)  SpO2: 96% (21 May 2019 00:15) (94% - 100%)    Physical Exam:  General: NAD  Abdomen: soft, ND, firm fundus palpated at the umbilicus. Dressing removed. Incision clean, dry, and intact.  Ext: nontender lower extremities, bilaterally.     Labs: post op CBC pending

## 2019-05-21 NOTE — PROGRESS NOTE ADULT - SUBJECTIVE AND OBJECTIVE BOX
Postoperative day #1 status post repeat  section, bilateral salpingectomy of a FEMALE  O POSITIVE, Rubella Immune      S:  The patient has no complaints.      O:      Vitals  T(C): 36.6 (19 @ 09:03), Max: 36.9 (19 @ 15:03)  HR: 86 (19 @ 09:03) (59 - 86)  BP: 104/68 (19 @ 09:03) (99/62 - 129/90)  RR: 18 (19 @ 09:03) (14 - 20)  SpO2: 96% (19 @ 00:15) (94% - 99%)      Labs  Complete Blood Count + Automated Diff (19 @ 08:15)    WBC Count: 12.9 K/uL    Hemoglobin: 10.2 g/dL    Hematocrit: 30.6 %    Platelet Count - Automated: 237 K/uL    Complete Blood Count + Automated Diff (19 @ 08:33)    WBC Count: 12.8 K/uL    Hemoglobin: 12.1 g/dL    Hematocrit: 35.8 %    Platelet Count - Automated: 316 K/uL      Physical Exam  Chest:  Clear to auscultation bilaterally.  No rales, rhonchi, wheezes.  Cardiovascular:  +s1, s2.  Regular rate and rhythm.  Abdomen:  Soft, non-tender, non-distended, +bowel sounds.  Uterus:  Fundus firm below umbilicus.  Incision:  Clean, dry, intact.  Vaginal exam:  Lochia.  Extremities:  Non-tender.      A/P:  POD#1  -Stable.  Advance care.  -Continue routine postpartum/postoperative care and education.

## 2019-05-22 RX ADMIN — Medication 975 MILLIGRAM(S): at 07:00

## 2019-05-22 RX ADMIN — Medication 975 MILLIGRAM(S): at 18:24

## 2019-05-22 RX ADMIN — Medication 975 MILLIGRAM(S): at 01:40

## 2019-05-22 RX ADMIN — Medication 975 MILLIGRAM(S): at 23:47

## 2019-05-22 RX ADMIN — HEPARIN SODIUM 5000 UNIT(S): 5000 INJECTION INTRAVENOUS; SUBCUTANEOUS at 09:33

## 2019-05-22 RX ADMIN — Medication 975 MILLIGRAM(S): at 19:24

## 2019-05-22 RX ADMIN — Medication 975 MILLIGRAM(S): at 06:18

## 2019-05-22 RX ADMIN — Medication 600 MILLIGRAM(S): at 10:30

## 2019-05-22 RX ADMIN — Medication 600 MILLIGRAM(S): at 09:33

## 2019-05-22 RX ADMIN — Medication 600 MILLIGRAM(S): at 04:25

## 2019-05-22 RX ADMIN — Medication 600 MILLIGRAM(S): at 05:00

## 2019-05-22 RX ADMIN — Medication 600 MILLIGRAM(S): at 15:47

## 2019-05-22 RX ADMIN — SIMETHICONE 80 MILLIGRAM(S): 80 TABLET, CHEWABLE ORAL at 15:06

## 2019-05-22 RX ADMIN — Medication 600 MILLIGRAM(S): at 15:05

## 2019-05-22 RX ADMIN — Medication 975 MILLIGRAM(S): at 01:01

## 2019-05-22 RX ADMIN — Medication 600 MILLIGRAM(S): at 21:25

## 2019-05-22 RX ADMIN — Medication 600 MILLIGRAM(S): at 22:25

## 2019-05-22 RX ADMIN — HEPARIN SODIUM 5000 UNIT(S): 5000 INJECTION INTRAVENOUS; SUBCUTANEOUS at 23:47

## 2019-05-22 NOTE — PROGRESS NOTE ADULT - SUBJECTIVE AND OBJECTIVE BOX
38y year old  now POD#2 s/p  section at 39wks gestation.     No acute overnight events. Pain well controlled.  Patient is ambulating, +voiding, +Flatus, -BM  Reports minimal lochia.   +breast feeding, -breast tenderness    Vital Signs Last 24 Hrs  T(C): 36.8 (21 May 2019 21:25), Max: 36.8 (21 May 2019 21:25)  T(F): 98.3 (21 May 2019 21:25), Max: 98.3 (21 May 2019 21:25)  HR: 82 (21 May 2019 21:25) (82 - 86)  BP: 113/75 (21 May 2019 21:25) (104/68 - 113/75)  RR: 16 (21 May 2019 21:25) (16 - 18)  SpO2: 98% (21 May 2019 21:25) (98% - 98%)    Physical Exam:  General: NAD  Abdomen: soft, ND, firm fundus palpated at the umbilicus. Dressing removed. Incision clean, dry, and intact.  Ext: nontender lower extremities, bilaterally.                LABS             10.2   12.9  )-----------( 237      ( 21 May 2019 08:15 )             30.6

## 2019-05-22 NOTE — PROGRESS NOTE ADULT - PROBLEM SELECTOR PLAN 1
Continue routine post-partum care.   Continue to encourage ambulation and breast feeding.   Pain management PRN.

## 2019-05-23 ENCOUNTER — TRANSCRIPTION ENCOUNTER (OUTPATIENT)
Age: 39
End: 2019-05-23

## 2019-05-23 VITALS
HEART RATE: 80 BPM | TEMPERATURE: 98 F | DIASTOLIC BLOOD PRESSURE: 80 MMHG | RESPIRATION RATE: 18 BRPM | SYSTOLIC BLOOD PRESSURE: 121 MMHG

## 2019-05-23 PROCEDURE — 81001 URINALYSIS AUTO W/SCOPE: CPT

## 2019-05-23 PROCEDURE — 86900 BLOOD TYPING SEROLOGIC ABO: CPT

## 2019-05-23 PROCEDURE — 59050 FETAL MONITOR W/REPORT: CPT

## 2019-05-23 PROCEDURE — 86765 RUBEOLA ANTIBODY: CPT

## 2019-05-23 PROCEDURE — 86780 TREPONEMA PALLIDUM: CPT

## 2019-05-23 PROCEDURE — 86850 RBC ANTIBODY SCREEN: CPT

## 2019-05-23 PROCEDURE — 85027 COMPLETE CBC AUTOMATED: CPT

## 2019-05-23 PROCEDURE — 36415 COLL VENOUS BLD VENIPUNCTURE: CPT

## 2019-05-23 PROCEDURE — 88302 TISSUE EXAM BY PATHOLOGIST: CPT

## 2019-05-23 PROCEDURE — 86703 HIV-1/HIV-2 1 RESULT ANTBDY: CPT

## 2019-05-23 PROCEDURE — 88304 TISSUE EXAM BY PATHOLOGIST: CPT

## 2019-05-23 PROCEDURE — 85730 THROMBOPLASTIN TIME PARTIAL: CPT

## 2019-05-23 PROCEDURE — 86901 BLOOD TYPING SEROLOGIC RH(D): CPT

## 2019-05-23 PROCEDURE — 87389 HIV-1 AG W/HIV-1&-2 AB AG IA: CPT

## 2019-05-23 PROCEDURE — G0463: CPT

## 2019-05-23 PROCEDURE — 85610 PROTHROMBIN TIME: CPT

## 2019-05-23 PROCEDURE — 59025 FETAL NON-STRESS TEST: CPT

## 2019-05-23 RX ORDER — ENOXAPARIN SODIUM 100 MG/ML
0 INJECTION SUBCUTANEOUS
Qty: 0 | Refills: 0 | DISCHARGE

## 2019-05-23 RX ORDER — IBUPROFEN 200 MG
1 TABLET ORAL
Qty: 30 | Refills: 0
Start: 2019-05-23

## 2019-05-23 RX ORDER — HEPARIN SODIUM 5000 [USP'U]/ML
0 INJECTION INTRAVENOUS; SUBCUTANEOUS
Qty: 0 | Refills: 0 | DISCHARGE

## 2019-05-23 RX ADMIN — Medication 600 MILLIGRAM(S): at 06:00

## 2019-05-23 RX ADMIN — Medication 975 MILLIGRAM(S): at 00:45

## 2019-05-23 RX ADMIN — Medication 600 MILLIGRAM(S): at 11:51

## 2019-05-23 RX ADMIN — Medication 600 MILLIGRAM(S): at 12:31

## 2019-05-23 RX ADMIN — Medication 600 MILLIGRAM(S): at 05:06

## 2019-05-23 NOTE — DISCHARGE NOTE OB - CARE PROVIDER_API CALL
Rustam Jaeger (MD)  Obstetrics and Gynecology  [address] [place] [address]  Phone: [phone]  Fax: [phone]  Follow Up Time:

## 2019-05-23 NOTE — DISCHARGE NOTE OB - PATIENT PORTAL LINK FT
You can access the [facility] Patient Portal, offered by [facility], by registering with the following website: [email or web address]

## 2019-05-23 NOTE — DISCHARGE NOTE OB - MEDICATION SUMMARY - MEDICATIONS TO TAKE
I will START or STAY ON the medications listed below when I get home from the hospital:    ibuprofen 600 mg oral tablet  -- 1 tab(s) by mouth every 6 hours  -- Indication: For Mild to moderate pain    Percocet 5/325 oral tablet  -- 1 tab(s) by mouth every 6 hours MDD:4  -- Caution federal law prohibits the transfer of this drug to any person other  than the person for whom it was prescribed.  May cause drowsiness.  Alcohol may intensify this effect.  Use care when operating dangerous machinery.  This prescription cannot be refilled.  This product contains acetaminophen.  Do not use  with any other product containing acetaminophen to prevent possible liver damage.  Using more of this medication than prescribed may cause serious breathing problems.    -- Indication: For severe pain

## 2019-05-23 NOTE — PROGRESS NOTE ADULT - SUBJECTIVE AND OBJECTIVE BOX
Postpartum Note,  Section  She is a  38y woman who is now post-operative day: #3 with excision of scar fibrosis and PEPE    Subjective:  The patient feels well.  She is ambulating without difficulty.  She is tolerating PO.  She is voiding.  She denies nausea and vomiting.  Her pain is controlled.  She reports normal postpartum bleeding  She is breastfeeding.      Physical exam:    Vital Signs Last 24 Hrs  T(C): 36.5 (23 May 2019 07:46), Max: 36.9 (22 May 2019 20:00)  T(F): 97.7 (23 May 2019 07:46), Max: 98.4 (22 May 2019 20:00)  HR: 80 (23 May 2019 07:46) (80 - 90)  BP: 121/80 (23 May 2019 07:46) (116/81 - 121/80)    RR: 18 (23 May 2019 07:46) (18 - 18)    Gen: NAD  Breast: Soft, nontender, non engorged  Abdomen: Soft, nontender, no distension , firm uterine fundus at umbilicus.  Incision: Clean, dry, and intact with steri strips  Pelvic: Normal lochia noted  Ext: No calf tenderness      blood type O+      Allergies    No Known Allergies        MEDICATIONS  (STANDING):  acetaminophen   Tablet .. 975 milliGRAM(s) Oral every 6 hours  citric acid/sodium citrate Solution 30 milliLiter(s) Oral once  diphtheria/tetanus/pertussis (acellular) Vaccine (ADAcel) 0.5 milliLiter(s) IntraMuscular once  heparin  Injectable 5000 Unit(s) SubCutaneous every 12 hours  ibuprofen  Tablet. 600 milliGRAM(s) Oral every 6 hours  lactated ringers. 1000 milliLiter(s) (125 mL/Hr) IV Continuous <Continuous>  lactated ringers. 1000 milliLiter(s) (125 mL/Hr) IV Continuous <Continuous>  oxytocin Infusion 333.333 milliUNIT(s)/Min (1000 mL/Hr) IV Continuous <Continuous>  oxytocin Infusion 333.333 milliUNIT(s)/Min (1000 mL/Hr) IV Continuous <Continuous>    MEDICATIONS  (PRN):  diphenhydrAMINE 25 milliGRAM(s) Oral every 6 hours PRN Itching  diphenhydrAMINE   Injectable 25 milliGRAM(s) IV Push every 4 hours PRN Pruritus  docusate sodium 100 milliGRAM(s) Oral two times a day PRN Stool softening  glycerin Suppository - Adult 1 Suppository(s) Rectal at bedtime PRN Constipation  HYDROmorphone  Injectable 1 milliGRAM(s) IV Push every 3 hours PRN Severe Pain (7 - 10)  lanolin Ointment 1 Application(s) Topical every 6 hours PRN Sore Nipples  magnesium hydroxide Suspension 30 milliLiter(s) Oral two times a day PRN Constipation  metoclopramide Injectable 10 milliGRAM(s) IV Push once PRN Nausea and/or Vomiting  naloxone Injectable 0.1 milliGRAM(s) IV Push every 3 minutes PRN For ANY of the following changes in patient status:  A. RR LESS THAN 10 breaths per minute, B. Oxygen saturation LESS THAN 90%, C. Sedation score of 6  ondansetron Injectable 4 milliGRAM(s) IV Push every 6 hours PRN Nausea  oxyCODONE    IR 5 milliGRAM(s) Oral every 3 hours PRN Mild Pain (1 - 3)  oxyCODONE    IR 10 milliGRAM(s) Oral every 3 hours PRN Moderate Pain (4 - 6)  oxyCODONE    IR 5 milliGRAM(s) Oral every 3 hours PRN Moderate Pain (4 - 6)  oxyCODONE    IR 5 milliGRAM(s) Oral once PRN Severe Pain (7 - 10)  simethicone 80 milliGRAM(s) Chew every 4 hours PRN Gas        Assessment and Plan  POD # 3 s/p repeat  section, bilateral salpingectomies, excision of scar, PEPE  Doing well.  Encourage ambulation.  Discharge home today.  Prescriptions for percocet and motrin electronically sent to the pharmacy.  F/U in 2 weeks for incision check.  Call for fevers, chills, nausea, vomiting, heavy vaginal bleeding, vaginal discharge, severe pain, symptoms of depression, problems with incision or any other concerning symptoms.  Nothing in vagina and no heavy lifting x 6 weeks.  No driving x 2 weeks.

## 2019-05-23 NOTE — DISCHARGE NOTE OB - MATERIALS PROVIDED
Breastfeeding Mother’s Support Group Information/Guide to Postpartum Care/[facility] Hearing Screen Program/Vaccinations/[facility]  Screening Program/Back To Sleep Handout/[place]  Immunization Record/Breastfeeding Log

## 2019-05-23 NOTE — PROGRESS NOTE ADULT - ASSESSMENT
A/P: Patient is a 37yo  now POD#3 s/p  section + BS  -Stable  -Voiding, tolerating PO, bowel function nml   -Advance care as tolerated   -Continue routine postpartum/postoperative care and education.  -DVT: Lovenox + SCDs  -Pt encouraged to increase ambulation and PO intake.  -D/C today after attending rounds

## 2019-05-23 NOTE — PROGRESS NOTE ADULT - SUBJECTIVE AND OBJECTIVE BOX
Patient is a 37yo  now POD#3 s/p  section + BS    S:    No acute events overnight.  Pt seen and examined at bedside. Pt doing well.  Has no complaints.  Pain well controlled on current regiment.  Pt ambulating, tolerating PO, voiding w/o difficulty, +flatus/+BM.  Eager to go home today.    O:    T(C): 36.5 (19 @ 07:46), Max: 36.9 (19 @ 08:48)  HR: 80 (19 @ 07:46) (80 - 92)  BP: 121/80 (19 @ 07:46) (116/81 - 121/80)  RR: 18 (19 @ 07:46) (18 - 18)  SpO2: --  Wt(kg): --    Physical Exam  Breast: NT, non-engorged  Abdomen:  soft, NT, ND, BS+, incision c/d/i  Uterus:  firm below umbilicus  VE:  expectant lochia  Ext:  NT, nonedematous                          10.2   12.9  )-----------( 237      ( 21 May 2019 08:15 )             30.6

## 2019-05-28 LAB — SURGICAL PATHOLOGY STUDY: SIGNIFICANT CHANGE UP

## 2019-05-30 ENCOUNTER — APPOINTMENT (OUTPATIENT)
Dept: OBGYN | Facility: CLINIC | Age: 39
End: 2019-05-30
Payer: COMMERCIAL

## 2019-05-30 VITALS
BODY MASS INDEX: 32.57 KG/M2 | HEIGHT: 62 IN | WEIGHT: 177 LBS | DIASTOLIC BLOOD PRESSURE: 76 MMHG | SYSTOLIC BLOOD PRESSURE: 120 MMHG

## 2019-05-30 PROCEDURE — 99024 POSTOP FOLLOW-UP VISIT: CPT

## 2019-05-30 NOTE — HISTORY OF PRESENT ILLNESS
[Postpartum Follow Up] : postpartum follow up [Delivery Date: ___] : on [unfilled] [Repeat C/S] : delivered by  section (repeat) [Complications:___] : no complications [Breastfeeding] : currently nursing [None] : No associated symptoms are reported [Clean/Dry/Intact] : clean, dry and intact [Mild] : mild vaginal bleeding [Cervix Sample Taken] : cervical sample not taken for a Pap smear [Doing Well] : is doing well [de-identified] : Normal,exam, the incision is healing well with excellent cosmesis. She had the steri strips removed and replaced, residual suture knots removed, Will cover in 1 week with silicone dressings. She feels great and she is very happy, no s/s of PP depression.

## 2019-05-31 PROBLEM — O99.019 ANEMIA COMPLICATING PREGNANCY, UNSPECIFIED TRIMESTER: Chronic | Status: ACTIVE | Noted: 2019-05-20

## 2019-06-27 ENCOUNTER — APPOINTMENT (OUTPATIENT)
Dept: OBGYN | Facility: CLINIC | Age: 39
End: 2019-06-27
Payer: COMMERCIAL

## 2019-06-27 ENCOUNTER — RESULT CHARGE (OUTPATIENT)
Age: 39
End: 2019-06-27

## 2019-06-27 VITALS
WEIGHT: 174 LBS | BODY MASS INDEX: 32.02 KG/M2 | SYSTOLIC BLOOD PRESSURE: 112 MMHG | DIASTOLIC BLOOD PRESSURE: 72 MMHG | HEIGHT: 62 IN

## 2019-06-27 DIAGNOSIS — Z31.69 ENCOUNTER FOR OTHER GENERAL COUNSELING AND ADVICE ON PROCREATION: ICD-10-CM

## 2019-06-27 PROCEDURE — 81025 URINE PREGNANCY TEST: CPT

## 2019-06-27 PROCEDURE — 81003 URINALYSIS AUTO W/O SCOPE: CPT | Mod: QW

## 2019-06-27 PROCEDURE — 0503F POSTPARTUM CARE VISIT: CPT

## 2019-06-27 NOTE — HISTORY OF PRESENT ILLNESS
[Postpartum Follow Up] : postpartum follow up [Last Pap Date: ___] : Last Pap Date: [unfilled] [Delivery Date: ___] : on [unfilled] [Repeat C/S] : delivered by  section (repeat) [Female] : Delivery History: baby girl [Wt. ___] : weighing [unfilled] [BTL] : bilateral tubal ligation completed [Breastfeeding] : currently nursing [Complications:___] : no complications [BreastFeeding Problems] : breastfeeding problems [S/Sx PP Depression] : no signs/symptoms of postpartum depression [Incisional Pain] : no incisional pain [Doing Well] : is doing well [No Sign of Infection] : is showing no signs of infection [FreeTextEntry8] : Doing very well. She is breast feeding. She denies signs or symptoms of PPD/anxiety. She has a lot of help at home. [de-identified] : Doing well.\par \par She has no signs or symptoms of postpartum anxiety or depression.\par \par We discussed signs and symptoms of depression and anxiety and she was instructed to call us immediately if she developed any signs. Annual exam in October.

## 2019-06-28 LAB
BILIRUB UR QL STRIP: NORMAL
GLUCOSE UR-MCNC: NORMAL
HCG UR QL: 0.2 EU/DL
HCG UR QL: NEGATIVE
HGB UR QL STRIP.AUTO: NORMAL
KETONES UR-MCNC: NORMAL
LEUKOCYTE ESTERASE UR QL STRIP: ABNORMAL
NITRITE UR QL STRIP: NORMAL
PH UR STRIP: 6.5
PROT UR STRIP-MCNC: NORMAL
QUALITY CONTROL: YES
SP GR UR STRIP: 1.01

## 2019-12-05 LAB
BILIRUB UR QL STRIP: NORMAL
COLLECTION METHOD: NORMAL
GLUCOSE UR-MCNC: NORMAL
HCG UR QL: 0.2 EU/DL
HGB UR QL STRIP.AUTO: NORMAL
KETONES UR-MCNC: ABNORMAL
LEUKOCYTE ESTERASE UR QL STRIP: ABNORMAL
NITRITE UR QL STRIP: NORMAL
PH UR STRIP: 6.5
PROT UR STRIP-MCNC: NORMAL
SP GR UR STRIP: 1.02

## 2020-04-11 LAB
ALBUMIN SERPL ELPH-MCNC: 3.7 G/DL
ALP BLD-CCNC: 57 U/L
ALT SERPL-CCNC: 12 U/L
ANION GAP SERPL CALC-SCNC: 13 MMOL/L
APPEARANCE: ABNORMAL
AST SERPL-CCNC: 13 U/L
BACTERIA UR CULT: NORMAL
BACTERIA: NEGATIVE
BILIRUB SERPL-MCNC: 0.2 MG/DL
BILIRUBIN URINE: NEGATIVE
BLOOD URINE: NEGATIVE
BUN SERPL-MCNC: 6 MG/DL
CALCIUM SERPL-MCNC: 9.1 MG/DL
CHLORIDE SERPL-SCNC: 102 MMOL/L
CO2 SERPL-SCNC: 22 MMOL/L
COLOR: NORMAL
CREAT SERPL-MCNC: 0.42 MG/DL
GLUCOSE QUALITATIVE U: NEGATIVE
GLUCOSE SERPL-MCNC: 83 MG/DL
HOMOCYSTEINE LEVEL: 4.8 UMOL/L
HYALINE CASTS: 2 /LPF
KETONES URINE: ABNORMAL
LEUKOCYTE ESTERASE URINE: NEGATIVE
METHYLMALONIC ACID LEVEL: 103 NMOL/L
MICROSCOPIC-UA: NORMAL
NITRITE URINE: NEGATIVE
PH URINE: 6
POTASSIUM SERPL-SCNC: 4 MMOL/L
PROT SERPL-MCNC: 6.4 G/DL
PROTEIN URINE: NEGATIVE
RBC # BLD: 3.71 M/UL
RED BLOOD CELLS URINE: 1 /HPF
RETICS # AUTO: 1.7 %
RETICS AGGREG/RBC NFR: 64.6 K/UL
SODIUM SERPL-SCNC: 137 MMOL/L
SPECIFIC GRAVITY URINE: 1.01
SQUAMOUS EPITHELIAL CELLS: 7 /HPF
UROBILINOGEN URINE: NORMAL
WHITE BLOOD CELLS URINE: 3 /HPF

## 2020-07-17 ENCOUNTER — LABORATORY RESULT (OUTPATIENT)
Age: 40
End: 2020-07-17

## 2020-07-17 ENCOUNTER — APPOINTMENT (OUTPATIENT)
Dept: INTERNAL MEDICINE | Facility: CLINIC | Age: 40
End: 2020-07-17
Payer: COMMERCIAL

## 2020-07-17 VITALS
OXYGEN SATURATION: 98 % | SYSTOLIC BLOOD PRESSURE: 120 MMHG | HEIGHT: 62 IN | BODY MASS INDEX: 32.94 KG/M2 | TEMPERATURE: 97.6 F | WEIGHT: 179 LBS | RESPIRATION RATE: 18 BRPM | DIASTOLIC BLOOD PRESSURE: 84 MMHG | HEART RATE: 78 BPM

## 2020-07-17 DIAGNOSIS — Z80.42 FAMILY HISTORY OF MALIGNANT NEOPLASM OF PROSTATE: ICD-10-CM

## 2020-07-17 DIAGNOSIS — Z80.9 FAMILY HISTORY OF MALIGNANT NEOPLASM, UNSPECIFIED: ICD-10-CM

## 2020-07-17 DIAGNOSIS — D72.829 ELEVATED WHITE BLOOD CELL COUNT, UNSPECIFIED: ICD-10-CM

## 2020-07-17 DIAGNOSIS — Z82.49 FAMILY HISTORY OF ISCHEMIC HEART DISEASE AND OTHER DISEASES OF THE CIRCULATORY SYSTEM: ICD-10-CM

## 2020-07-17 DIAGNOSIS — Z84.2 FAMILY HISTORY OF OTHER DISEASES OF THE GENITOURINARY SYSTEM: ICD-10-CM

## 2020-07-17 DIAGNOSIS — D64.9 ANEMIA, UNSPECIFIED: ICD-10-CM

## 2020-07-17 PROCEDURE — 99205 OFFICE O/P NEW HI 60 MIN: CPT | Mod: 25

## 2020-07-17 RX ORDER — ISOPROPYL ALCOHOL 70 ML/100ML
SWAB TOPICAL
Qty: 1 | Refills: 0 | Status: DISCONTINUED | COMMUNITY
Start: 2019-05-01 | End: 2020-07-17

## 2020-07-17 RX ORDER — HEPARIN SODIUM 5000 [USP'U]/ML
5000 INJECTION, SOLUTION INTRAVENOUS; SUBCUTANEOUS
Qty: 6 | Refills: 1 | Status: DISCONTINUED | COMMUNITY
Start: 2019-05-01 | End: 2020-07-17

## 2020-07-17 RX ORDER — ASPIRIN 81 MG
81 TABLET,CHEWABLE ORAL
Refills: 0 | Status: DISCONTINUED | COMMUNITY
End: 2020-07-17

## 2020-07-17 RX ORDER — CONTAINER,EMPTY
EACH MISCELLANEOUS
Qty: 2 | Refills: 0 | Status: DISCONTINUED | COMMUNITY
Start: 2019-05-01 | End: 2020-07-17

## 2020-07-17 RX ORDER — SYRINGE WITH NEEDLE, 1 ML 27GX1/2"
27G X 1/2" SYRINGE, EMPTY DISPOSABLE MISCELLANEOUS
Qty: 1 | Refills: 0 | Status: DISCONTINUED | COMMUNITY
Start: 2019-05-01 | End: 2020-07-17

## 2020-07-17 RX ORDER — FOLIC ACID 1 MG/1
1 TABLET ORAL TWICE DAILY
Qty: 120 | Refills: 3 | Status: DISCONTINUED | COMMUNITY
Start: 2018-12-13 | End: 2020-07-17

## 2020-07-17 RX ORDER — ENOXAPARIN SODIUM 100 MG/ML
40 INJECTION SUBCUTANEOUS DAILY
Qty: 12 | Refills: 9 | Status: DISCONTINUED | COMMUNITY
Start: 2019-01-28 | End: 2020-07-17

## 2020-07-17 NOTE — HISTORY OF PRESENT ILLNESS
[de-identified] : Patient is a 39-year-old white female, with a history of anemia, leukocytosis, and intrauterine demise, who presents for initial evaluation.\par \par The patient states, that she had a missed AB at approximately 8 weeks with her first pregnancy. With her second pregnancy, she had fetal demise at 20 weeks. She was noted to have intrauterine growth retardation. The placenta was evaluated, and there was evidence of vascular insufficiency. 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 Her homocystine levels were normal. With subsequent pregnancies, she was subsequently seen by a high risk obstetrician, Dr. Bruner. She was treated with aspirin and Lovenox throughout her subsequent pregnancies and she has subsequently had 3 healthy children that were delivered. She did develop gestational diabetes with her first pregnancy. She also developed preeclampsia. The following 2 pregnancies after the first one, were only remarkable for leukocytosis. She did see a hematologist, Dr. Hilton during her third pregnancy. She was noted to have iron deficiency anemia as well as B12 deficiency. The elevated white blood cell count, was felt to be that of pregnancy as well. She was treated with B12 and iron for several months and then she did not take it thereafter. She has not followed up.\par \par At present, the patient states she feels well. Recently, she denies any fevers, night sweats, chills, major fluctuations in her weight, loss of appetite, change in bowel or urinary habits, chest pain or chest tightness, dyspnea, or dyspnea on exertion.\par \par The patient's pulmonary history is negative for cigarette smoking, pneumonia, asthma, TB, TB exposure, ankle swelling, paroxysmal nocturnal dyspnea, orthopnea, pets, or toxic exposure. She has traveled to [place] within the past 2 years.\par \par \par Health Risk Assessment\par \par Tobacco Use: No. \par Alcohol Use: Yes. occasionally. \par DAST-1: In the past 12 months have you used drugs other than those required for medical reasons? No. \par Depression Screen:. \par 1) Little interest or pleasure doing things: Not at all (0).  \par 2) Feeling down, depressed, or hopeless: Not at all (0). \par \par \par PAP Smear: 01/19. \par Employment Status: employed. \par Occupation: Teacher. \par Home Safety: smoke detector and carbon monoxide detector, but no guns at home. \par General Safety Concerns: uses seat belt, uses sunscreen. Health Risk Assessment\par \par Tobacco Use: No. \par Alcohol Use: Yes. occasionally. \par DAST-1: In the past 12 months have you used drugs other than those required for medical reasons? No. \par Depression Screen:. \par 1) Little interest or pleasure doing things: Not at all (0).  \par 2) Feeling down, depressed, or hopeless: Not at all (0). \par \par \par PAP Smear: 01/19. \par Employment Status: employed. \par Occupation: Teacher. \par Home Safety: smoke detector and carbon monoxide detector, but no guns at home. \par General Safety Concerns: uses seat belt, uses sunscreen.

## 2020-07-17 NOTE — PHYSICAL EXAM
[No Acute Distress] : no acute distress [Well-Appearing] : well-appearing [Well Developed] : well developed [Normal Sclera/Conjunctiva] : normal sclera/conjunctiva [EOMI] : extraocular movements intact [Normal Oropharynx] : the oropharynx was normal [Normal Outer Ear/Nose] : the outer ears and nose were normal in appearance [No JVD] : no jugular venous distention [Thyroid Normal, No Nodules] : the thyroid was normal and there were no nodules present [Supple] : supple [No Respiratory Distress] : no respiratory distress  [Clear to Auscultation] : lungs were clear to auscultation bilaterally [No Accessory Muscle Use] : no accessory muscle use [Normal Rate] : normal rate  [Regular Rhythm] : with a regular rhythm [Normal S1, S2] : normal S1 and S2 [No Murmur] : no murmur heard [No Edema] : there was no peripheral edema [No Extremity Clubbing/Cyanosis] : no extremity clubbing/cyanosis [Soft] : abdomen soft [Non-distended] : non-distended [Non Tender] : non-tender [No Masses] : no abdominal mass palpated [No HSM] : no HSM [Normal Bowel Sounds] : normal bowel sounds [Normal Posterior Cervical Nodes] : no posterior cervical lymphadenopathy [Normal Supraclavicular Nodes] : no supraclavicular lymphadenopathy [Normal Anterior Cervical Nodes] : no anterior cervical lymphadenopathy [No CVA Tenderness] : no CVA  tenderness [No Spinal Tenderness] : no spinal tenderness [No Joint Swelling] : no joint swelling [No Rash] : no rash [Coordination Grossly Intact] : coordination grossly intact [Normal Gait] : normal gait [Normal Affect] : the affect was normal [Deep Tendon Reflexes (DTR)] : deep tendon reflexes were 2+ and symmetric [Normal Insight/Judgement] : insight and judgment were intact [de-identified] : Overweight white female

## 2020-07-17 NOTE — PLAN
[FreeTextEntry1] : 1. The patient will undergo routine fasting blood work at this time, which will also include iron studies, hemoglobin A1c, vitamin B12, and vitamin D levels in addition to folate.\par \par 2. Routine yearly GYN followup.\par \par 3. A cardiovascular exercise regimen of 150 minutes per week has been recommended if possible.\par \par 4. Diet and weight loss\par \par 5. Followup in one year with a wellness evaluation and yearly routine blood work.

## 2020-07-17 NOTE — HISTORY OF PRESENT ILLNESS
[de-identified] : Patient is a 39-year-old white female, with a history of anemia, leukocytosis, and intrauterine demise, who presents for initial evaluation.\par \par The patient states, that she had a missed AB at approximately 8 weeks with her first pregnancy. With her second pregnancy, she had fetal demise at 20 weeks. She was noted to have intrauterine growth retardation. The placenta was evaluated, and there was evidence of vascular insufficiency. 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 Her homocystine levels were normal. With subsequent pregnancies, she was subsequently seen by a high risk obstetrician, Dr. Bruner. She was treated with aspirin and Lovenox throughout her subsequent pregnancies and she has subsequently had 3 healthy children that were delivered. She did develop gestational diabetes with her first pregnancy. She also developed preeclampsia. The following 2 pregnancies after the first one, were only remarkable for leukocytosis. She did see a hematologist, Dr. Hilton during her third pregnancy. She was noted to have iron deficiency anemia as well as B12 deficiency. The elevated white blood cell count, was felt to be that of pregnancy as well. She was treated with B12 and iron for several months and then she did not take it thereafter. She has not followed up.\par \par At present, the patient states she feels well. Recently, she denies any fevers, night sweats, chills, major fluctuations in her weight, loss of appetite, change in bowel or urinary habits, chest pain or chest tightness, dyspnea, or dyspnea on exertion.\par \par The patient's pulmonary history is negative for cigarette smoking, pneumonia, asthma, TB, TB exposure, ankle swelling, paroxysmal nocturnal dyspnea, orthopnea, pets, or toxic exposure. She has traveled to [place] within the past 2 years.\par \par \par Health Risk Assessment\par \par Tobacco Use: No. \par Alcohol Use: Yes. occasionally. \par DAST-1: In the past 12 months have you used drugs other than those required for medical reasons? No. \par Depression Screen:. \par 1) Little interest or pleasure doing things: Not at all (0).  \par 2) Feeling down, depressed, or hopeless: Not at all (0). \par \par \par PAP Smear: 01/19. \par Employment Status: employed. \par Occupation: Teacher. \par Home Safety: smoke detector and carbon monoxide detector, but no guns at home. \par General Safety Concerns: uses seat belt, uses sunscreen. Health Risk Assessment\par \par Tobacco Use: No. \par Alcohol Use: Yes. occasionally. \par DAST-1: In the past 12 months have you used drugs other than those required for medical reasons? No. \par Depression Screen:. \par 1) Little interest or pleasure doing things: Not at all (0).  \par 2) Feeling down, depressed, or hopeless: Not at all (0). \par \par \par PAP Smear: 01/19. \par Employment Status: employed. \par Occupation: Teacher. \par Home Safety: smoke detector and carbon monoxide detector, but no guns at home. \par General Safety Concerns: uses seat belt, uses sunscreen.

## 2020-07-17 NOTE — HEALTH RISK ASSESSMENT
[Yes] : Yes [No] : In the past 12 months have you used drugs other than those required for medical reasons? 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

## 2020-07-17 NOTE — HEALTH RISK ASSESSMENT
[Yes] : Yes [No] : In the past 12 months have you used drugs other than those required for medical reasons? 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

## 2020-07-17 NOTE — PHYSICAL EXAM
[No Acute Distress] : no acute distress [Well-Appearing] : well-appearing [Well Developed] : well developed [EOMI] : extraocular movements intact [Normal Sclera/Conjunctiva] : normal sclera/conjunctiva [Normal Oropharynx] : the oropharynx was normal [Normal Outer Ear/Nose] : the outer ears and nose were normal in appearance [No JVD] : no jugular venous distention [Supple] : supple [Thyroid Normal, No Nodules] : the thyroid was normal and there were no nodules present [No Respiratory Distress] : no respiratory distress  [No Accessory Muscle Use] : no accessory muscle use [Clear to Auscultation] : lungs were clear to auscultation bilaterally [Normal Rate] : normal rate  [Regular Rhythm] : with a regular rhythm [Normal S1, S2] : normal S1 and S2 [No Murmur] : no murmur heard [No Edema] : there was no peripheral edema [No Extremity Clubbing/Cyanosis] : no extremity clubbing/cyanosis [Soft] : abdomen soft [Non Tender] : non-tender [Non-distended] : non-distended [No HSM] : no HSM [No Masses] : no abdominal mass palpated [Normal Bowel Sounds] : normal bowel sounds [Normal Posterior Cervical Nodes] : no posterior cervical lymphadenopathy [Normal Supraclavicular Nodes] : no supraclavicular lymphadenopathy [No CVA Tenderness] : no CVA  tenderness [Normal Anterior Cervical Nodes] : no anterior cervical lymphadenopathy [No Spinal Tenderness] : no spinal tenderness [No Joint Swelling] : no joint swelling [No Rash] : no rash [Coordination Grossly Intact] : coordination grossly intact [Normal Gait] : normal gait [Deep Tendon Reflexes (DTR)] : deep tendon reflexes were 2+ and symmetric [Normal Affect] : the affect was normal [Normal Insight/Judgement] : insight and judgment were intact [de-identified] : Overweight white female

## 2020-07-21 LAB
25(OH)D3 SERPL-MCNC: 40.1 NG/ML
ALBUMIN SERPL ELPH-MCNC: 5.1 G/DL
ALP BLD-CCNC: 56 U/L
ALT SERPL-CCNC: 11 U/L
ANION GAP SERPL CALC-SCNC: 15 MMOL/L
APPEARANCE: CLEAR
AST SERPL-CCNC: 14 U/L
BACTERIA: NEGATIVE
BASOPHILS # BLD AUTO: 0.05 K/UL
BASOPHILS NFR BLD AUTO: 0.6 %
BILIRUB SERPL-MCNC: 1.5 MG/DL
BILIRUBIN URINE: NEGATIVE
BLOOD URINE: NEGATIVE
BUN SERPL-MCNC: 14 MG/DL
CALCIUM SERPL-MCNC: 9.3 MG/DL
CHLORIDE SERPL-SCNC: 98 MMOL/L
CHOLEST SERPL-MCNC: 223 MG/DL
CHOLEST/HDLC SERPL: 3.9 RATIO
CO2 SERPL-SCNC: 23 MMOL/L
COLOR: NORMAL
CREAT SERPL-MCNC: 0.64 MG/DL
EOSINOPHIL # BLD AUTO: 0.07 K/UL
EOSINOPHIL NFR BLD AUTO: 0.8 %
ESTIMATED AVERAGE GLUCOSE: 111 MG/DL
FERRITIN SERPL-MCNC: 64 NG/ML
FOLATE SERPL-MCNC: >20 NG/ML
GLUCOSE QUALITATIVE U: NEGATIVE
GLUCOSE SERPL-MCNC: 83 MG/DL
HBA1C MFR BLD HPLC: 5.5 %
HCT VFR BLD CALC: 41.9 %
HDLC SERPL-MCNC: 58 MG/DL
HGB BLD-MCNC: 14 G/DL
HYALINE CASTS: 0 /LPF
IMM GRANULOCYTES NFR BLD AUTO: 0.4 %
IRON SATN MFR SERPL: 27 %
IRON SERPL-MCNC: 80 UG/DL
KETONES URINE: ABNORMAL
LDLC SERPL CALC-MCNC: 150 MG/DL
LEUKOCYTE ESTERASE URINE: NEGATIVE
LYMPHOCYTES # BLD AUTO: 1.64 K/UL
LYMPHOCYTES NFR BLD AUTO: 19.9 %
MAN DIFF?: NORMAL
MCHC RBC-ENTMCNC: 29.5 PG
MCHC RBC-ENTMCNC: 33.4 GM/DL
MCV RBC AUTO: 88.4 FL
MICROSCOPIC-UA: NORMAL
MONOCYTES # BLD AUTO: 0.57 K/UL
MONOCYTES NFR BLD AUTO: 6.9 %
NEUTROPHILS # BLD AUTO: 5.89 K/UL
NEUTROPHILS NFR BLD AUTO: 71.4 %
NITRITE URINE: NEGATIVE
PH URINE: 6.5
PLATELET # BLD AUTO: 397 K/UL
POTASSIUM SERPL-SCNC: 4.1 MMOL/L
PROT SERPL-MCNC: 7.7 G/DL
PROTEIN URINE: NEGATIVE
RBC # BLD: 4.74 M/UL
RBC # FLD: 12.9 %
RED BLOOD CELLS URINE: 3 /HPF
SODIUM SERPL-SCNC: 137 MMOL/L
SPECIFIC GRAVITY URINE: 1.01
SQUAMOUS EPITHELIAL CELLS: 1 /HPF
T3FREE SERPL-MCNC: 2.65 PG/ML
T3RU NFR SERPL: 0.9 TBI
T4 FREE SERPL-MCNC: 1.1 NG/DL
T4 SERPL-MCNC: 6.6 UG/DL
TIBC SERPL-MCNC: 300 UG/DL
TRANSFERRIN SERPL-MCNC: 253 MG/DL
TRIGL SERPL-MCNC: 73 MG/DL
TSH SERPL-ACNC: 1.27 UIU/ML
UIBC SERPL-MCNC: 220 UG/DL
UROBILINOGEN URINE: NORMAL
VIT B12 SERPL-MCNC: 543 PG/ML
WBC # FLD AUTO: 8.25 K/UL
WHITE BLOOD CELLS URINE: 0 /HPF

## 2020-07-24 DIAGNOSIS — R77.0 ABNORMALITY OF ALBUMIN: ICD-10-CM

## 2020-07-24 DIAGNOSIS — R17 UNSPECIFIED JAUNDICE: ICD-10-CM

## 2020-07-30 ENCOUNTER — APPOINTMENT (OUTPATIENT)
Dept: ULTRASOUND IMAGING | Facility: CLINIC | Age: 40
End: 2020-07-30

## 2020-07-30 ENCOUNTER — OUTPATIENT (OUTPATIENT)
Dept: OUTPATIENT SERVICES | Facility: HOSPITAL | Age: 40
LOS: 1 days | End: 2020-07-30
Payer: COMMERCIAL

## 2020-07-30 DIAGNOSIS — Z00.8 ENCOUNTER FOR OTHER GENERAL EXAMINATION: ICD-10-CM

## 2020-07-30 DIAGNOSIS — Z98.89 OTHER SPECIFIED POSTPROCEDURAL STATES: Chronic | ICD-10-CM

## 2020-07-30 DIAGNOSIS — R17 UNSPECIFIED JAUNDICE: ICD-10-CM

## 2020-07-30 DIAGNOSIS — Z87.2 PERSONAL HISTORY OF DISEASES OF THE SKIN AND SUBCUTANEOUS TISSUE: Chronic | ICD-10-CM

## 2020-07-30 PROCEDURE — 76700 US EXAM ABDOM COMPLETE: CPT | Mod: 26

## 2020-07-30 PROCEDURE — 76700 US EXAM ABDOM COMPLETE: CPT

## 2020-08-31 LAB
ALBUMIN SERPL ELPH-MCNC: 4.6 G/DL
ALP BLD-CCNC: 43 U/L
ALT SERPL-CCNC: 15 U/L
AST SERPL-CCNC: 16 U/L
BILIRUB DIRECT SERPL-MCNC: 0.3 MG/DL
BILIRUB INDIRECT SERPL-MCNC: 1.3 MG/DL
BILIRUB SERPL-MCNC: 1.5 MG/DL
PROT SERPL-MCNC: 6.6 G/DL

## 2020-12-23 ENCOUNTER — OUTPATIENT (OUTPATIENT)
Dept: OUTPATIENT SERVICES | Facility: HOSPITAL | Age: 40
LOS: 1 days | End: 2020-12-23
Payer: COMMERCIAL

## 2020-12-23 DIAGNOSIS — Z87.2 PERSONAL HISTORY OF DISEASES OF THE SKIN AND SUBCUTANEOUS TISSUE: Chronic | ICD-10-CM

## 2020-12-23 DIAGNOSIS — Z98.89 OTHER SPECIFIED POSTPROCEDURAL STATES: Chronic | ICD-10-CM

## 2020-12-23 DIAGNOSIS — Z20.828 CONTACT WITH AND (SUSPECTED) EXPOSURE TO OTHER VIRAL COMMUNICABLE DISEASES: ICD-10-CM

## 2020-12-23 LAB — SARS-COV-2 RNA SPEC QL NAA+PROBE: SIGNIFICANT CHANGE UP

## 2020-12-23 PROCEDURE — U0003: CPT

## 2020-12-23 PROCEDURE — C9803: CPT

## 2020-12-24 DIAGNOSIS — Z20.828 CONTACT WITH AND (SUSPECTED) EXPOSURE TO OTHER VIRAL COMMUNICABLE DISEASES: ICD-10-CM

## 2021-01-21 ENCOUNTER — APPOINTMENT (OUTPATIENT)
Dept: OBGYN | Facility: CLINIC | Age: 41
End: 2021-01-21
Payer: COMMERCIAL

## 2021-01-21 VITALS
BODY MASS INDEX: 23.74 KG/M2 | SYSTOLIC BLOOD PRESSURE: 108 MMHG | DIASTOLIC BLOOD PRESSURE: 62 MMHG | WEIGHT: 129 LBS | TEMPERATURE: 97.3 F | HEIGHT: 62 IN

## 2021-01-21 DIAGNOSIS — O24.419 GESTATIONAL DIABETES MELLITUS IN PREGNANCY, UNSPECIFIED CONTROL: ICD-10-CM

## 2021-01-21 DIAGNOSIS — N96 RECURRENT PREGNANCY LOSS: ICD-10-CM

## 2021-01-21 DIAGNOSIS — Z34.93 ENCOUNTER FOR SUPERVISION OF NORMAL PREGNANCY, UNSPECIFIED, THIRD TRIMESTER: ICD-10-CM

## 2021-01-21 DIAGNOSIS — Z86.32 PERSONAL HISTORY OF GESTATIONAL DIABETES: ICD-10-CM

## 2021-01-21 DIAGNOSIS — Z87.59 PERSONAL HISTORY OF OTHER COMPLICATIONS OF PREGNANCY, CHILDBIRTH AND THE PUERPERIUM: ICD-10-CM

## 2021-01-21 DIAGNOSIS — Z3A.33 33 WEEKS GESTATION OF PREGNANCY: ICD-10-CM

## 2021-01-21 DIAGNOSIS — Z3A.31 31 WEEKS GESTATION OF PREGNANCY: ICD-10-CM

## 2021-01-21 DIAGNOSIS — O09.90 SUPERVISION OF HIGH RISK PREGNANCY, UNSPECIFIED, UNSPECIFIED TRIMESTER: ICD-10-CM

## 2021-01-21 DIAGNOSIS — Z01.419 ENCOUNTER FOR GYNECOLOGICAL EXAMINATION (GENERAL) (ROUTINE) W/OUT ABNORMAL FINDINGS: ICD-10-CM

## 2021-01-21 DIAGNOSIS — N60.19 DIFFUSE CYSTIC MASTOPATHY OF UNSPECIFIED BREAST: ICD-10-CM

## 2021-01-21 DIAGNOSIS — Z3A.27 27 WEEKS GESTATION OF PREGNANCY: ICD-10-CM

## 2021-01-21 PROCEDURE — 99072 ADDL SUPL MATRL&STAF TM PHE: CPT

## 2021-01-21 PROCEDURE — 99395 PREV VISIT EST AGE 18-39: CPT

## 2021-01-21 PROCEDURE — 81003 URINALYSIS AUTO W/O SCOPE: CPT | Mod: QW

## 2021-01-21 NOTE — HISTORY OF PRESENT ILLNESS
[Y] : Patient uses contraception [Menarche Age: ____] : age at menarche was [unfilled] [Currently Active] : currently active [Men] : men [Vaginal] : vaginal [No] : No [TextBox_4] : Annual [PapSmeardate] : 9/26/18 [TextBox_31] : negative [LMPDate] : 1/5/21 [PGxTotal] : 6 [[facility] HospitallTerm] : 3 [[facility] Hospitaliving] : 3 [TextBox_29] : n/a [TextBox_36] : n/a [TextBox_6] : 1/5/21 [TextBox_9] : 12 [FreeTextEntry1] : 1/5/21

## 2021-01-21 NOTE — DISCUSSION/SUMMARY
[FreeTextEntry1] : During this visit comprehensive counseling was given regarding the following concerns:\par \par 1 We discussed the need for proper nutrition and exercise.  This includes cardiovascular and pelvic floor exercise.\par \par 2 We discussed the importance of maintaining a proper vaccination schedule and we discussed the utility of the flu vaccine and TDaP.\par \par 3 We discussed the impact of chronic sun exposure and the risk for skin disease as a result. The benefits of a total body scan by a Dermatologist was reviewed..\par \par 4 We discussed the need for certain supplements for most people which include vitamin D3, calcium rich foods with limitation on calcium supplementation by tabular form to 600        mg by mouth daily.  As part of a bone health program we recommend weightbearing exercises, and some limited sun exposure ( 10-15 minutes daily) to help convert vitamin D to its active form.\par She will go for a baseline mammogram.

## 2021-01-30 ENCOUNTER — APPOINTMENT (OUTPATIENT)
Dept: MAMMOGRAPHY | Facility: CLINIC | Age: 41
End: 2021-01-30
Payer: COMMERCIAL

## 2021-01-30 ENCOUNTER — RESULT REVIEW (OUTPATIENT)
Age: 41
End: 2021-01-30

## 2021-01-30 ENCOUNTER — OUTPATIENT (OUTPATIENT)
Dept: OUTPATIENT SERVICES | Facility: HOSPITAL | Age: 41
LOS: 1 days | End: 2021-01-30
Payer: COMMERCIAL

## 2021-01-30 DIAGNOSIS — Z98.89 OTHER SPECIFIED POSTPROCEDURAL STATES: Chronic | ICD-10-CM

## 2021-01-30 DIAGNOSIS — Z87.2 PERSONAL HISTORY OF DISEASES OF THE SKIN AND SUBCUTANEOUS TISSUE: Chronic | ICD-10-CM

## 2021-01-30 DIAGNOSIS — Z00.8 ENCOUNTER FOR OTHER GENERAL EXAMINATION: ICD-10-CM

## 2021-01-30 PROCEDURE — 77067 SCR MAMMO BI INCL CAD: CPT

## 2021-01-30 PROCEDURE — 77063 BREAST TOMOSYNTHESIS BI: CPT | Mod: 26

## 2021-01-30 PROCEDURE — 77063 BREAST TOMOSYNTHESIS BI: CPT

## 2021-01-30 PROCEDURE — 77067 SCR MAMMO BI INCL CAD: CPT | Mod: 26

## 2021-02-17 ENCOUNTER — OUTPATIENT (OUTPATIENT)
Dept: OUTPATIENT SERVICES | Facility: HOSPITAL | Age: 41
LOS: 1 days | End: 2021-02-17
Payer: COMMERCIAL

## 2021-02-17 DIAGNOSIS — Z20.828 CONTACT WITH AND (SUSPECTED) EXPOSURE TO OTHER VIRAL COMMUNICABLE DISEASES: ICD-10-CM

## 2021-02-17 DIAGNOSIS — Z98.89 OTHER SPECIFIED POSTPROCEDURAL STATES: Chronic | ICD-10-CM

## 2021-02-17 DIAGNOSIS — Z87.2 PERSONAL HISTORY OF DISEASES OF THE SKIN AND SUBCUTANEOUS TISSUE: Chronic | ICD-10-CM

## 2021-02-17 LAB — SARS-COV-2 RNA SPEC QL NAA+PROBE: SIGNIFICANT CHANGE UP

## 2021-02-17 PROCEDURE — U0005: CPT

## 2021-02-17 PROCEDURE — U0003: CPT

## 2021-02-17 PROCEDURE — C9803: CPT

## 2021-02-18 DIAGNOSIS — Z20.828 CONTACT WITH AND (SUSPECTED) EXPOSURE TO OTHER VIRAL COMMUNICABLE DISEASES: ICD-10-CM

## 2021-02-23 ENCOUNTER — OUTPATIENT (OUTPATIENT)
Dept: OUTPATIENT SERVICES | Facility: HOSPITAL | Age: 41
LOS: 1 days | End: 2021-02-23
Payer: COMMERCIAL

## 2021-02-23 DIAGNOSIS — Z87.2 PERSONAL HISTORY OF DISEASES OF THE SKIN AND SUBCUTANEOUS TISSUE: Chronic | ICD-10-CM

## 2021-02-23 DIAGNOSIS — Z98.89 OTHER SPECIFIED POSTPROCEDURAL STATES: Chronic | ICD-10-CM

## 2021-02-23 DIAGNOSIS — Z20.828 CONTACT WITH AND (SUSPECTED) EXPOSURE TO OTHER VIRAL COMMUNICABLE DISEASES: ICD-10-CM

## 2021-02-23 LAB — SARS-COV-2 RNA SPEC QL NAA+PROBE: SIGNIFICANT CHANGE UP

## 2021-02-23 PROCEDURE — C9803: CPT

## 2021-02-23 PROCEDURE — U0005: CPT

## 2021-02-23 PROCEDURE — U0003: CPT

## 2021-02-24 DIAGNOSIS — Z20.828 CONTACT WITH AND (SUSPECTED) EXPOSURE TO OTHER VIRAL COMMUNICABLE DISEASES: ICD-10-CM

## 2021-08-03 ENCOUNTER — NON-APPOINTMENT (OUTPATIENT)
Age: 41
End: 2021-08-03

## 2021-09-14 ENCOUNTER — NON-APPOINTMENT (OUTPATIENT)
Age: 41
End: 2021-09-14

## 2021-09-15 ENCOUNTER — NON-APPOINTMENT (OUTPATIENT)
Age: 41
End: 2021-09-15

## 2021-09-15 ENCOUNTER — APPOINTMENT (OUTPATIENT)
Dept: DERMATOLOGY | Facility: CLINIC | Age: 41
End: 2021-09-15
Payer: COMMERCIAL

## 2021-09-15 DIAGNOSIS — D23.9 OTHER BENIGN NEOPLASM OF SKIN, UNSPECIFIED: ICD-10-CM

## 2021-09-15 PROCEDURE — 99203 OFFICE O/P NEW LOW 30 MIN: CPT

## 2021-09-15 NOTE — HISTORY OF PRESENT ILLNESS
[de-identified] : Pt. presents for skin check;\par c/o few spots of concern;  lesion on breast, noted by OB/Gyn\par Severity:  mild  \par Modifying factors:  none\par Associated symptoms:  none\par Context:  no association with activity\par

## 2021-09-15 NOTE — PHYSICAL EXAM
[Full Body Skin Exam Performed] : performed [FreeTextEntry3] : Skin examination performed of the face, neck, trunk, arms, legs; \par The patient is well, alert and oriented, pleasant and cooperative.\par Eyelids, conjunctivae, oral mucosa, digits and nails all normal.  \par No cervical adenopathy.\par \par Normal findings include:\par \par Multiple benign nevi were noted. \par small, regular junctional nevus L lower lateral breast;  \par Firm, pink, subcutaneous buttonholing papule R shin\par Angiomas\par Lentigines\par \par No lesions were suspicious for malignancy. \par \par

## 2021-09-15 NOTE — ASSESSMENT
[FreeTextEntry1] : Complete skin examination is negative for malignancy; Multiple new concerns were addressed and discussed.\par Therapeutic options and their risks and benefits; along with multiple diagnostic possibilities were discussed at length;\par risks and benefits of skin biopsy and/or other further study were discussed;\par \par Continue regular exams; \par \par benign nevus L breast; no suspicious features; \par benign DF R leg\par \par Follow up for TBSE in 1 year

## 2021-11-13 ENCOUNTER — TRANSCRIPTION ENCOUNTER (OUTPATIENT)
Age: 41
End: 2021-11-13

## 2022-01-26 ENCOUNTER — APPOINTMENT (OUTPATIENT)
Dept: OBGYN | Facility: CLINIC | Age: 42
End: 2022-01-26

## 2022-03-05 LAB
CYTOLOGY CVX/VAG DOC THIN PREP: NORMAL
HPV HIGH+LOW RISK DNA PNL CVX: NOT DETECTED

## 2022-03-22 ENCOUNTER — TRANSCRIPTION ENCOUNTER (OUTPATIENT)
Age: 42
End: 2022-03-22

## 2022-03-29 ENCOUNTER — APPOINTMENT (OUTPATIENT)
Dept: INTERNAL MEDICINE | Facility: CLINIC | Age: 42
End: 2022-03-29

## 2022-05-02 NOTE — DISCHARGE NOTE OB - PROVIDER RX CONTACT NUMBER
MyChart Assistance [phone]                
[phone]

## 2022-06-20 ENCOUNTER — NON-APPOINTMENT (OUTPATIENT)
Age: 42
End: 2022-06-20

## 2022-09-14 ENCOUNTER — APPOINTMENT (OUTPATIENT)
Dept: DERMATOLOGY | Facility: CLINIC | Age: 42
End: 2022-09-14

## 2022-09-14 DIAGNOSIS — L81.4 OTHER MELANIN HYPERPIGMENTATION: ICD-10-CM

## 2022-09-14 DIAGNOSIS — D22.5 MELANOCYTIC NEVI OF TRUNK: ICD-10-CM

## 2022-09-14 PROCEDURE — 99213 OFFICE O/P EST LOW 20 MIN: CPT

## 2022-09-14 NOTE — HISTORY OF PRESENT ILLNESS
[de-identified] : Pt. presents for skin check;\par c/o few spots of concern; \par Severity:  mild  \par Modifying factors:  none\par Associated symptoms:  none\par Context:  no association with activity\par

## 2022-09-14 NOTE — ASSESSMENT
[FreeTextEntry1] : Complete skin examination is negative for malignancy; Multiple new concerns were addressed and discussed.\par Therapeutic options and their risks and benefits; along with multiple diagnostic possibilities were discussed at length;\par risks and benefits of skin biopsy and/or other further study were discussed;\par \par Continue regular exams; \par \par benign nevus L chest; \par benign DF R leg\par \par Follow up for TBSE in 1 year

## 2022-09-14 NOTE — PHYSICAL EXAM
[Full Body Skin Exam Performed] : performed [FreeTextEntry3] : Skin examination performed of the face, neck, trunk, arms, legs; \par The patient is well, alert and oriented, pleasant and cooperative.\par Eyelids, conjunctivae, oral mucosa, digits and nails all normal.  \par No cervical adenopathy.\par \par Normal findings include:\par \par Multiple benign nevi were noted. \par fleshy brown nevus L upper chest\par Firm, pink, subcutaneous buttonholing papule R shin\par Angiomas\par Lentigines\par \par No lesions were suspicious for malignancy. \par \par

## 2022-10-17 ENCOUNTER — APPOINTMENT (OUTPATIENT)
Dept: OBGYN | Facility: CLINIC | Age: 42
End: 2022-10-17

## 2023-01-06 NOTE — OB RN DELIVERY SUMMARY - NS_LABOR[facility]_OBGYN_ALL_OB_FT
4D Prednisone Counseling:  I discussed with the patient the risks of prolonged use of prednisone including but not limited to weight gain, insomnia, osteoporosis, mood changes, diabetes, susceptibility to infection, glaucoma and high blood pressure.  In cases where prednisone use is prolonged, patients should be monitored with blood pressure checks, serum glucose levels and an eye exam.  Additionally, the patient may need to be placed on GI prophylaxis, PCP prophylaxis, and calcium and vitamin D supplementation and/or a bisphosphonate.  The patient verbalized understanding of the proper use and the possible adverse effects of prednisone.  All of the patient's questions and concerns were addressed.

## 2023-02-21 ENCOUNTER — APPOINTMENT (OUTPATIENT)
Dept: INTERNAL MEDICINE | Facility: CLINIC | Age: 43
End: 2023-02-21

## 2023-03-03 NOTE — DISCHARGE NOTE OB - NS OB DC TDAP REASON NOT RECEIVED
Requested medication(s) are due for refill today: Yes  Patient has already received a courtesy refill: No  Other reason request has been forwarded to provider: Contraindicated

## 2023-05-10 ENCOUNTER — NON-APPOINTMENT (OUTPATIENT)
Age: 43
End: 2023-05-10

## 2023-05-24 ENCOUNTER — NON-APPOINTMENT (OUTPATIENT)
Age: 43
End: 2023-05-24

## 2023-07-12 ENCOUNTER — NON-APPOINTMENT (OUTPATIENT)
Age: 43
End: 2023-07-12

## 2023-08-07 ENCOUNTER — APPOINTMENT (OUTPATIENT)
Dept: OBGYN | Facility: CLINIC | Age: 43
End: 2023-08-07
Payer: COMMERCIAL

## 2023-08-07 VITALS
HEIGHT: 62 IN | SYSTOLIC BLOOD PRESSURE: 112 MMHG | BODY MASS INDEX: 34.04 KG/M2 | WEIGHT: 185 LBS | DIASTOLIC BLOOD PRESSURE: 80 MMHG

## 2023-08-07 DIAGNOSIS — Z12.39 ENCOUNTER FOR OTHER SCREENING FOR MALIGNANT NEOPLASM OF BREAST: ICD-10-CM

## 2023-08-07 DIAGNOSIS — E80.4 GILBERT SYNDROME: ICD-10-CM

## 2023-08-07 DIAGNOSIS — Z12.4 ENCOUNTER FOR SCREENING FOR MALIGNANT NEOPLASM OF CERVIX: ICD-10-CM

## 2023-08-07 DIAGNOSIS — R92.2 INCONCLUSIVE MAMMOGRAM: ICD-10-CM

## 2023-08-07 DIAGNOSIS — Z12.12 ENCOUNTER FOR SCREENING FOR MALIGNANT NEOPLASM OF RECTUM: ICD-10-CM

## 2023-08-07 DIAGNOSIS — Z15.89 GENETIC SUSCEPTIBILITY TO OTHER DISEASE: ICD-10-CM

## 2023-08-07 LAB
DATE COLLECTED: NORMAL
HEMOCCULT SP1 STL QL: NEGATIVE
QUALITY CONTROL: YES

## 2023-08-07 PROCEDURE — 99396 PREV VISIT EST AGE 40-64: CPT

## 2023-08-07 PROCEDURE — 82270 OCCULT BLOOD FECES: CPT

## 2023-08-07 NOTE — END OF VISIT
[FreeTextEntry3] : I, Melany Hogan solely acted as a scribe for Dr. Rustam Jaeger on 08/07/2023. All medical entries made by the scribe were at my, Dr. Jaeger's, direction and personally dictated by me on 08/07/2023. I have reviewed the chart and agree that the record accurately reflects my personal performance of the history, physical exam, assessment and plan. I have also personally directed, reviewed, and agreed with the chart.

## 2023-08-07 NOTE — PHYSICAL EXAM
[Chaperone Present] : A chaperone was present in the examining room during all aspects of the physical examination [Appropriately responsive] : appropriately responsive [Alert] : alert [No Acute Distress] : no acute distress [Soft] : soft [Non-tender] : non-tender [No Mass] : no mass [Oriented x3] : oriented x3 [Examination Of The Breasts] : a normal appearance [No Discharge] : no discharge [No Masses] : no breast masses were palpable [Labia Majora] : normal [Labia Minora] : normal [No Bleeding] : There was no active vaginal bleeding [Normal] : normal [Uterine Adnexae] : normal [FreeTextEntry1] : NAIMA Thomas [No Tenderness] : no tenderness [Nl Sphincter Tone] : normal sphincter tone [FreeTextEntry9] : Heme negative

## 2023-08-07 NOTE — DISCUSSION/SUMMARY
[FreeTextEntry1] : -Annual gynecology exam done today. Pap collected.  . The importance of a baseline colonoscopy screening was discussed. She was strongly encouraged to follow up with GI or colorectal surgeon for baseline colonoscopy screening.  -We discussed the benefit of pelvic floor exercises. She was instructed on the proper technique for contraction of the pelvic floor muscles and she was encouraged to perform this 20- 30 times daily as part of a general exercise program.  -During this visit comprehensive counseling was given regarding the following concerns:  1. We discussed the need for proper nutrition and exercise. This includes cardiovascular and pelvic floor exercises.  2. We discussed the importance of maintaining proper vaccination schedule and we discussed the utility of the HPV, Influenza, Shingles, and TDaP vaccines.   3. We discussed the impact of chronic sun exposure and the risk for skin disease as a result. The benefits of a total body scan by a Dermatologist was reviewed.  4. We discussed the need for certain supplements for most people, which include Vitamin D3 and calcium rich foods with limitation on calcium supplementation by tabular form to 600 MG by mouth daily. As part of bone health program, we recommend weight bearing exercises and limited sun exposure (10-15 minutes daily) to help convert Vitamin D to its active form.  -Prescription for mammogram and breast ultrasound ordered.  -Gun awareness and protocols discussed.  -Recommended to use pyrethrin for ticks.  -Recommended to hydrate more, as well as exercising 3-4x a week.  -She will follow up in 1 year or as needed.  -All questions and concerns were addressed.

## 2023-08-07 NOTE — HISTORY OF PRESENT ILLNESS
[Tubal Occlusion] : has had a tubal occlusion [Menarche Age: ____] : age at menarche was [unfilled] [Y] : Patient is sexually active [Currently Active] : currently active [Mammogramdate] : 1/30/21 [TextBox_19] : BR1 [PapSmeardate] : 1/21/21 [TextBox_31] : NEG [HPVDate] : 1/21/21 [TextBox_78] : NEG [LMPDate] : 7/22/23 [PGxTotal] : 6 [[facility]] : 3 [[facility] Centeriving] : 3 [PGxABSpont] : 3 [FreeTextEntry1] : 7/22/23

## 2023-08-08 ENCOUNTER — APPOINTMENT (OUTPATIENT)
Dept: INTERNAL MEDICINE | Facility: CLINIC | Age: 43
End: 2023-08-08
Payer: COMMERCIAL

## 2023-08-08 VITALS
HEART RATE: 81 BPM | BODY MASS INDEX: 35.15 KG/M2 | OXYGEN SATURATION: 98 % | WEIGHT: 191 LBS | SYSTOLIC BLOOD PRESSURE: 100 MMHG | TEMPERATURE: 98.3 F | HEIGHT: 62 IN | DIASTOLIC BLOOD PRESSURE: 70 MMHG | RESPIRATION RATE: 16 BRPM

## 2023-08-08 VITALS — DIASTOLIC BLOOD PRESSURE: 76 MMHG | SYSTOLIC BLOOD PRESSURE: 114 MMHG

## 2023-08-08 DIAGNOSIS — E66.3 OVERWEIGHT: ICD-10-CM

## 2023-08-08 DIAGNOSIS — Z00.00 ENCOUNTER FOR GENERAL ADULT MEDICAL EXAMINATION W/OUT ABNORMAL FINDINGS: ICD-10-CM

## 2023-08-08 LAB — HPV HIGH+LOW RISK DNA PNL CVX: NOT DETECTED

## 2023-08-08 PROCEDURE — 99396 PREV VISIT EST AGE 40-64: CPT

## 2023-08-08 NOTE — PLAN
[FreeTextEntry1] : 1. Diet and exercise for weight loss. Consider endocrine evaluation and SGLT2 medications in the future, if she is unsuccessful losing weight on her own with diet and exercise.  2. Follow up in one year for yearly wellness exam and fasting blood workup.  3. Yearly blood work to be performed today.  4. Continue with routine GYN follow up 5.  Follow up with dermatologist, Dr. Soriano, for total body skin exam.  6.  COVID and flu vaccines have been recommended in the fall.

## 2023-08-08 NOTE — PHYSICAL EXAM
[Well Developed] : well developed [Well-Appearing] : well-appearing [Normal Sclera/Conjunctiva] : normal sclera/conjunctiva [EOMI] : extraocular movements intact [Normal Outer Ear/Nose] : the outer ears and nose were normal in appearance [Normal Oropharynx] : the oropharynx was normal [No JVD] : no jugular venous distention [Supple] : supple [Thyroid Normal, No Nodules] : the thyroid was normal and there were no nodules present [No Respiratory Distress] : no respiratory distress  [No Accessory Muscle Use] : no accessory muscle use [Clear to Auscultation] : lungs were clear to auscultation bilaterally [Normal Rate] : normal rate  [Regular Rhythm] : with a regular rhythm [Normal S1, S2] : normal S1 and S2 [No Murmur] : no murmur heard [No Edema] : there was no peripheral edema [No Extremity Clubbing/Cyanosis] : no extremity clubbing/cyanosis [Soft] : abdomen soft [Non Tender] : non-tender [Non-distended] : non-distended [No Masses] : no abdominal mass palpated [No HSM] : no HSM [Normal Bowel Sounds] : normal bowel sounds [No CVA Tenderness] : no CVA  tenderness [No Spinal Tenderness] : no spinal tenderness [No Joint Swelling] : no joint swelling [No Rash] : no rash [Coordination Grossly Intact] : coordination grossly intact [Normal Gait] : normal gait [Deep Tendon Reflexes (DTR)] : deep tendon reflexes were 2+ and symmetric [Normal Affect] : the affect was normal [Normal Insight/Judgement] : insight and judgment were intact [de-identified] : Overweight white female

## 2023-08-08 NOTE — HISTORY OF PRESENT ILLNESS
[FreeTextEntry1] : The patient comes in today for a wellness evaluation.  [de-identified] : Patient states, and overall, she is doing relatively well.  Unfortunately, she has gained weight and is currently at her heaviest weight to date. She reports that she is not happy with her current weight. Her current weight is 191 lbs.  She is aware, that she has not been compliant with dietary discretion, nor with exercise.  She had a routine GYN assessment with Dr. Che yesterday. She is pending a mammography. She saw her dermatologist, Dr. Soriano, about a year ago. Dermatology evaluation is not up to date.  She denies any change in bowel habits.  There has been no chest pain, shortness of breath, palpitations, or PND.  She denies any other acute constitutional symptoms.   She did receive the covid vaccination, but she did not receive the booster shot.  She now comes in for this assessment.

## 2023-08-08 NOTE — HEALTH RISK ASSESSMENT
[Yes] : Yes [Monthly or less (1 pt)] : Monthly or less (1 point) [1 or 2 (0 pts)] : 1 or 2 (0 points) [Never (0 pts)] : Never (0 points) [No] : In the past 12 months have you used drugs other than those required for medical reasons? No [0] : 2) Feeling down, depressed, or hopeless: Not at all (0) [PHQ-2 Negative - No further assessment needed] : PHQ-2 Negative - No further assessment needed [Never] : Never

## 2023-08-09 DIAGNOSIS — E78.5 HYPERLIPIDEMIA, UNSPECIFIED: ICD-10-CM

## 2023-08-09 LAB
25(OH)D3 SERPL-MCNC: 35.3 NG/ML
ALBUMIN SERPL ELPH-MCNC: 4.6 G/DL
ALP BLD-CCNC: 62 U/L
ALT SERPL-CCNC: 13 U/L
ANION GAP SERPL CALC-SCNC: 17 MMOL/L
APPEARANCE: CLEAR
AST SERPL-CCNC: 22 U/L
BACTERIA: NEGATIVE /HPF
BILIRUB SERPL-MCNC: 0.8 MG/DL
BILIRUBIN URINE: NEGATIVE
BLOOD URINE: NEGATIVE
BUN SERPL-MCNC: 10 MG/DL
CALCIUM SERPL-MCNC: 9.3 MG/DL
CAST: 0 /LPF
CHLORIDE SERPL-SCNC: 102 MMOL/L
CHOLEST SERPL-MCNC: 225 MG/DL
CO2 SERPL-SCNC: 20 MMOL/L
COLOR: YELLOW
CREAT SERPL-MCNC: 0.66 MG/DL
EGFR: 114 ML/MIN/1.73M2
EPITHELIAL CELLS: 3 /HPF
ESTIMATED AVERAGE GLUCOSE: 111 MG/DL
GLUCOSE QUALITATIVE U: NEGATIVE MG/DL
GLUCOSE SERPL-MCNC: 78 MG/DL
HBA1C MFR BLD HPLC: 5.5 %
HDLC SERPL-MCNC: 54 MG/DL
KETONES URINE: NEGATIVE MG/DL
LDLC SERPL CALC-MCNC: 153 MG/DL
LEUKOCYTE ESTERASE URINE: NEGATIVE
MICROSCOPIC-UA: NORMAL
NITRITE URINE: NEGATIVE
NONHDLC SERPL-MCNC: 171 MG/DL
PH URINE: 6.5
POTASSIUM SERPL-SCNC: 4.2 MMOL/L
PROT SERPL-MCNC: 7.1 G/DL
PROTEIN URINE: NEGATIVE MG/DL
RED BLOOD CELLS URINE: 1 /HPF
SODIUM SERPL-SCNC: 139 MMOL/L
SPECIFIC GRAVITY URINE: 1.01
T3FREE SERPL-MCNC: 2.97 PG/ML
T4 FREE SERPL-MCNC: 1.1 NG/DL
TRIGL SERPL-MCNC: 100 MG/DL
TSH SERPL-ACNC: 1.12 UIU/ML
UROBILINOGEN URINE: 0.2 MG/DL
WHITE BLOOD CELLS URINE: 0 /HPF

## 2023-08-10 LAB — CYTOLOGY CVX/VAG DOC THIN PREP: NORMAL

## 2023-08-16 ENCOUNTER — RESULT REVIEW (OUTPATIENT)
Age: 43
End: 2023-08-16

## 2023-08-16 ENCOUNTER — APPOINTMENT (OUTPATIENT)
Dept: DERMATOLOGY | Facility: CLINIC | Age: 43
End: 2023-08-16

## 2023-08-16 ENCOUNTER — OUTPATIENT (OUTPATIENT)
Dept: OUTPATIENT SERVICES | Facility: HOSPITAL | Age: 43
LOS: 1 days | End: 2023-08-16
Payer: COMMERCIAL

## 2023-08-16 ENCOUNTER — APPOINTMENT (OUTPATIENT)
Dept: ULTRASOUND IMAGING | Facility: CLINIC | Age: 43
End: 2023-08-16
Payer: COMMERCIAL

## 2023-08-16 ENCOUNTER — APPOINTMENT (OUTPATIENT)
Dept: MAMMOGRAPHY | Facility: CLINIC | Age: 43
End: 2023-08-16
Payer: COMMERCIAL

## 2023-08-16 DIAGNOSIS — R92.2 INCONCLUSIVE MAMMOGRAM: ICD-10-CM

## 2023-08-16 DIAGNOSIS — Z98.89 OTHER SPECIFIED POSTPROCEDURAL STATES: Chronic | ICD-10-CM

## 2023-08-16 DIAGNOSIS — Z87.2 PERSONAL HISTORY OF DISEASES OF THE SKIN AND SUBCUTANEOUS TISSUE: Chronic | ICD-10-CM

## 2023-08-16 DIAGNOSIS — Z12.39 ENCOUNTER FOR OTHER SCREENING FOR MALIGNANT NEOPLASM OF BREAST: ICD-10-CM

## 2023-08-16 PROCEDURE — 77067 SCR MAMMO BI INCL CAD: CPT

## 2023-08-16 PROCEDURE — 77067 SCR MAMMO BI INCL CAD: CPT | Mod: 26

## 2023-08-16 PROCEDURE — 76641 ULTRASOUND BREAST COMPLETE: CPT | Mod: 26,50

## 2023-08-16 PROCEDURE — 77063 BREAST TOMOSYNTHESIS BI: CPT | Mod: 26

## 2023-08-16 PROCEDURE — 76641 ULTRASOUND BREAST COMPLETE: CPT

## 2023-08-16 PROCEDURE — 77063 BREAST TOMOSYNTHESIS BI: CPT

## 2023-08-25 ENCOUNTER — RESULT REVIEW (OUTPATIENT)
Age: 43
End: 2023-08-25

## 2023-08-25 ENCOUNTER — APPOINTMENT (OUTPATIENT)
Dept: MAMMOGRAPHY | Facility: CLINIC | Age: 43
End: 2023-08-25
Payer: COMMERCIAL

## 2023-08-25 ENCOUNTER — OUTPATIENT (OUTPATIENT)
Dept: OUTPATIENT SERVICES | Facility: HOSPITAL | Age: 43
LOS: 1 days | End: 2023-08-25
Payer: COMMERCIAL

## 2023-08-25 ENCOUNTER — APPOINTMENT (OUTPATIENT)
Dept: ULTRASOUND IMAGING | Facility: CLINIC | Age: 43
End: 2023-08-25
Payer: COMMERCIAL

## 2023-08-25 DIAGNOSIS — Z98.89 OTHER SPECIFIED POSTPROCEDURAL STATES: Chronic | ICD-10-CM

## 2023-08-25 DIAGNOSIS — Z00.8 ENCOUNTER FOR OTHER GENERAL EXAMINATION: ICD-10-CM

## 2023-08-25 DIAGNOSIS — Z87.2 PERSONAL HISTORY OF DISEASES OF THE SKIN AND SUBCUTANEOUS TISSUE: Chronic | ICD-10-CM

## 2023-08-25 PROCEDURE — G0279: CPT | Mod: 26

## 2023-08-25 PROCEDURE — 77065 DX MAMMO INCL CAD UNI: CPT | Mod: 26,LT

## 2023-08-25 PROCEDURE — 76642 ULTRASOUND BREAST LIMITED: CPT | Mod: 26,50

## 2023-08-25 PROCEDURE — G0279: CPT

## 2023-08-25 PROCEDURE — 76642 ULTRASOUND BREAST LIMITED: CPT

## 2023-08-25 PROCEDURE — 77065 DX MAMMO INCL CAD UNI: CPT

## 2023-10-04 ENCOUNTER — NON-APPOINTMENT (OUTPATIENT)
Age: 43
End: 2023-10-04

## 2023-10-13 ENCOUNTER — NON-APPOINTMENT (OUTPATIENT)
Age: 43
End: 2023-10-13

## 2023-10-18 ENCOUNTER — NON-APPOINTMENT (OUTPATIENT)
Age: 43
End: 2023-10-18

## 2024-08-05 ENCOUNTER — NON-APPOINTMENT (OUTPATIENT)
Age: 44
End: 2024-08-05

## 2024-08-05 LAB
25(OH)D3 SERPL-MCNC: 44.1 NG/ML
ALBUMIN SERPL ELPH-MCNC: 4.6 G/DL
ALP BLD-CCNC: 54 U/L
ALT SERPL-CCNC: 10 U/L
ANION GAP SERPL CALC-SCNC: 19 MMOL/L
APPEARANCE: ABNORMAL
AST SERPL-CCNC: 13 U/L
BACTERIA: ABNORMAL /HPF
BASOPHILS # BLD AUTO: 0.04 K/UL
BASOPHILS NFR BLD AUTO: 0.4 %
BILIRUB SERPL-MCNC: 0.9 MG/DL
BILIRUBIN URINE: NEGATIVE
BLOOD URINE: NEGATIVE
BUN SERPL-MCNC: 11 MG/DL
CALCIUM SERPL-MCNC: 9.3 MG/DL
CAST: 0 /LPF
CHLORIDE SERPL-SCNC: 102 MMOL/L
CHOLEST SERPL-MCNC: 223 MG/DL
CO2 SERPL-SCNC: 17 MMOL/L
COLOR: YELLOW
CREAT SERPL-MCNC: 0.67 MG/DL
EGFR: 113 ML/MIN/1.73M2
EOSINOPHIL # BLD AUTO: 0.1 K/UL
EOSINOPHIL NFR BLD AUTO: 1.1 %
EPITHELIAL CELLS: >36 /HPF
ESTIMATED AVERAGE GLUCOSE: 103 MG/DL
GLUCOSE QUALITATIVE U: NEGATIVE MG/DL
GLUCOSE SERPL-MCNC: 89 MG/DL
HBA1C MFR BLD HPLC: 5.2 %
HCT VFR BLD CALC: 38.6 %
HDLC SERPL-MCNC: 49 MG/DL
HGB BLD-MCNC: 12.6 G/DL
IMM GRANULOCYTES NFR BLD AUTO: 0.3 %
KETONES URINE: 15 MG/DL
LDLC SERPL CALC-MCNC: 157 MG/DL
LEUKOCYTE ESTERASE URINE: NEGATIVE
LYMPHOCYTES # BLD AUTO: 1.82 K/UL
LYMPHOCYTES NFR BLD AUTO: 20.3 %
MAN DIFF?: NORMAL
MCHC RBC-ENTMCNC: 28.6 PG
MCHC RBC-ENTMCNC: 32.6 GM/DL
MCV RBC AUTO: 87.5 FL
MICROSCOPIC-UA: NORMAL
MONOCYTES # BLD AUTO: 0.58 K/UL
MONOCYTES NFR BLD AUTO: 6.5 %
NEUTROPHILS # BLD AUTO: 6.41 K/UL
NEUTROPHILS NFR BLD AUTO: 71.4 %
NITRITE URINE: NEGATIVE
NONHDLC SERPL-MCNC: 175 MG/DL
PH URINE: 6
PLATELET # BLD AUTO: 441 K/UL
POTASSIUM SERPL-SCNC: 4.2 MMOL/L
PROT SERPL-MCNC: 6.9 G/DL
PROTEIN URINE: NORMAL MG/DL
RBC # BLD: 4.41 M/UL
RBC # FLD: 14.7 %
RED BLOOD CELLS URINE: 3 /HPF
REVIEW: NORMAL
SODIUM SERPL-SCNC: 138 MMOL/L
SPECIFIC GRAVITY URINE: 1.02
T3FREE SERPL-MCNC: 2.52 PG/ML
T4 FREE SERPL-MCNC: 1.1 NG/DL
TRIGL SERPL-MCNC: 98 MG/DL
TSH SERPL-ACNC: 2.66 UIU/ML
UROBILINOGEN URINE: 0.2 MG/DL
WBC # FLD AUTO: 8.98 K/UL
WHITE BLOOD CELLS URINE: 1 /HPF

## 2024-08-08 ENCOUNTER — APPOINTMENT (OUTPATIENT)
Dept: OBGYN | Facility: CLINIC | Age: 44
End: 2024-08-08

## 2024-08-08 PROCEDURE — 99459 PELVIC EXAMINATION: CPT

## 2024-08-08 PROCEDURE — 99396 PREV VISIT EST AGE 40-64: CPT

## 2024-08-09 ENCOUNTER — APPOINTMENT (OUTPATIENT)
Dept: INTERNAL MEDICINE | Facility: CLINIC | Age: 44
End: 2024-08-09

## 2024-08-09 PROCEDURE — 99396 PREV VISIT EST AGE 40-64: CPT

## 2024-08-09 NOTE — PHYSICAL EXAM
[Well Developed] : well developed [Well-Appearing] : well-appearing [Normal Sclera/Conjunctiva] : normal sclera/conjunctiva [EOMI] : extraocular movements intact [Normal Outer Ear/Nose] : the outer ears and nose were normal in appearance [Normal Oropharynx] : the oropharynx was normal [No JVD] : no jugular venous distention [Supple] : supple [Thyroid Normal, No Nodules] : the thyroid was normal and there were no nodules present [No Respiratory Distress] : no respiratory distress  [No Accessory Muscle Use] : no accessory muscle use [Clear to Auscultation] : lungs were clear to auscultation bilaterally [Normal Rate] : normal rate  [Regular Rhythm] : with a regular rhythm [Normal S1, S2] : normal S1 and S2 [No Murmur] : no murmur heard [No Edema] : there was no peripheral edema [No Extremity Clubbing/Cyanosis] : no extremity clubbing/cyanosis [Soft] : abdomen soft [Non Tender] : non-tender [Non-distended] : non-distended [No Masses] : no abdominal mass palpated [No HSM] : no HSM [Normal Bowel Sounds] : normal bowel sounds [No CVA Tenderness] : no CVA  tenderness [No Spinal Tenderness] : no spinal tenderness [No Joint Swelling] : no joint swelling [No Rash] : no rash [Coordination Grossly Intact] : coordination grossly intact yes [Normal Gait] : normal gait [Normal Affect] : the affect was normal [Normal Insight/Judgement] : insight and judgment were intact [Normal Posterior Cervical Nodes] : no posterior cervical lymphadenopathy [Normal Anterior Cervical Nodes] : no anterior cervical lymphadenopathy [de-identified] : Overweight white female

## 2024-08-09 NOTE — PLAN
[FreeTextEntry1] : 1. Continue current medications as outlined above.   2. Follow up in 1 year with wellness and routine fasting bloodwork.    3. Continue to follow with gyn, Dr. Jaeger, for annual evaluations.   4. Continue to follow with dermatology for annual total body skin evaluation. She is set to follow up in February 2025.    5. The Influenza vaccine is recommended in the fall.   6. Cardiovascular exercise as tolerated.  7.  Diet and weight loss again have been recommended.  There is some concern about her family history of cardiovascular disease in that her grandparents have had coronary disease in their 50s, and there is also a family history of hypercholesterolemia.  She does not want to take medication at this time.  She will attempt dietary modifications in this regard.  If she is unable to achieve improvement in her lipids, I would consider cardiology evaluation possibly with Dr. Stallings or a lipid evaluation with Dr. Boateng.

## 2024-08-09 NOTE — ADDENDUM
[FreeTextEntry1] : This note was written by Sho Estrada on 08/09/2024 acting as a medical scribe for Dr. Shivam Alvarez MD. All medical entries made by the scribe were at my, Dr. Shivam Alvarez's, direction and personally dictated by me on 08/09/2024. I have reviewed the chart and agree that the record accurately reflects my personal performance of the history, review of systems, assessment, and plan. I have also personally directed, reviewed, and agreed with the chart.

## 2024-08-09 NOTE — HISTORY OF PRESENT ILLNESS
[FreeTextEntry1] :  The patient comes in for a yearly wellness evaluation. [de-identified] : The patient is feeling well at this time. She is not currently maintained on any medications. The patient states that she does not have a formal exercise routine but does occasionally walk. Of note, she has lost approximately 14 lbs since 8/23. There has been no chest pain, shortness of breath, palpitations, or PND.  There have been no bowel or urinary changes. She is also up to date on her GYN evaluation. She is set to undergo her dermatologic evaluation in February 2025. There have been no cough, fevers, chills, or night sweats. There have been no other acute constitutional symptoms. She comes in for this assessment.

## 2024-08-11 NOTE — PLAN
[FreeTextEntry1] : -normal exam -we discussed proper nutrition, hydration and exercise.  -she will follow with her PCP on her Gilbert's disease and for lipid profiles.  -we discussed the benefits of a screening colonoscopy.  -she will go for her breast imaging in 8/2024 at [facility]. During this visit comprehensive counseling was given regarding the following concerns:   1 We discussed the need for proper nutrition, hydration and exercise.  This includes cardiovascular and pelvic floor exercises.   2 We discussed the importance of maintaining a proper vaccination schedule and we discussed the utility of the currently available vaccines (Flu ,TDaP, Shingles, Covid 19 booster, RSV and Gardasil 9).   3 We discussed the impact of chronic sun exposure and the risk for skin disease as a result. The benefits of a total body scan by a Dermatologist were reviewed, along with sun-protective clothing, eye wear and sunblock.   4 We discussed the need for certain supplements for most people which include vitamin D3 (2000 IU daily), calcium rich foods with limitation on calcium supplementation by tabular form to 600 mg daily.  As part of a bone health program, we recommend weightbearing exercises, and some limited sun exposure (10-15 minutes daily) to help convert vitamin D to its active form.   5) We discussed the importance of an eye exam with dilation and retinal and corneal examination.   6) Mental health is very important and often times under served. We discussed the need for downtime, stress reduction and in the case of feelings such as profound sadness, suicidal or other unusual thoughts--the need to reach out for help.   7) Gun violence is a leading cause of death and injury in children, and it is important to focus on proper storage, gun locks and education of family members if there are guns in the home.

## 2024-08-11 NOTE — PHYSICAL EXAM
[Chaperone Present] : A chaperone was present in the examining room during all aspects of the physical examination [[id number]] : A chaperone was present during the pelvic exam. [FreeTextEntry2] : ANTON MEDRANO MA [Appropriately responsive] : appropriately responsive [Alert] : alert [No Acute Distress] : no acute distress [No Lymphadenopathy] : no lymphadenopathy [Soft] : soft [Non-tender] : non-tender [Non-distended] : non-distended [No Mass] : no mass [Oriented x3] : oriented x3 [Examination Of The Breasts] : a normal appearance [No Masses] : no breast masses were palpable [Labia Majora] : normal [Labia Minora] : normal [Normal] : normal [Uterine Adnexae] : normal

## 2024-08-11 NOTE — HISTORY OF PRESENT ILLNESS
[Patient reported PAP Smear was normal] : Patient reported PAP Smear was normal [N] : Patient reports normal menses [Tubal Occlusion] : has had a tubal occlusion [Y] : Positive pregnancy history [Menarche Age: ____] : age at menarche was [unfilled] [No] : Patient does not have concerns regarding sex [Currently Active] : currently active [Mammogramdate] : 08/25/2023 [TextBox_19] : br1 [BreastSonogramDate] : 08/25/2023 [TextBox_25] : br1 [PapSmeardate] : 08/2023 [HPVDate] : 08/2023 [TextBox_78] : neg [LMPDate] : 07/24/2024 [PGxTotal] : 6 [[facility]] : 3 [[facility]] : 3 [PGxABSpont] : 3 [FreeTextEntry1] : 07/24/2024

## 2024-08-11 NOTE — HISTORY OF PRESENT ILLNESS
[Patient reported PAP Smear was normal] : Patient reported PAP Smear was normal [N] : Patient reports normal menses [Tubal Occlusion] : has had a tubal occlusion [Y] : Positive pregnancy history [Menarche Age: ____] : age at menarche was [unfilled] [No] : Patient does not have concerns regarding sex [Currently Active] : currently active [Mammogramdate] : 08/25/2023 [TextBox_19] : br1 [BreastSonogramDate] : 08/25/2023 [TextBox_25] : br1 [PapSmeardate] : 08/2023 [HPVDate] : 08/2023 [TextBox_78] : neg [LMPDate] : 07/24/2024 [PGxTotal] : 6 [[facility]] : 3 [[facility]iving] : 3 [PGxABSpont] : 3 [FreeTextEntry1] : 07/24/2024

## 2024-08-11 NOTE — PHYSICAL EXAM
[Chaperone Present] : A chaperone was present in the examining room during all aspects of the physical examination [[id number]] : A chaperone was present during the pelvic exam. [FreeTextEntry2] : ANTON MEDRANO MA [Appropriately responsive] : appropriately responsive [Alert] : alert [No Acute Distress] : no acute distress [No Lymphadenopathy] : no lymphadenopathy [Soft] : soft [Non-tender] : non-tender [Non-distended] : non-distended [No Mass] : no mass [Oriented x3] : oriented x3 [Examination Of The Breasts] : a normal appearance [No Masses] : no breast masses were palpable [Labia Majora] : normal [Labia Minora] : normal [Normal] : normal [Uterine Adnexae] : normal

## 2024-08-11 NOTE — PLAN
[FreeTextEntry1] : -normal exam -we discussed proper nutrition, hydration and exercise.  -she will follow with her PCP on her Gilbert's disease and for lipid profiles.  -we discussed the benefits of a screening colonoscopy.  -she will go for her breast imaging in 8/2024 at [facility]. During this visit comprehensive counseling was given regarding the following concerns:   1 We discussed the need for proper nutrition, hydration and exercise.  This includes cardiovascular and pelvic floor exercises.   2 We discussed the importance of maintaining a proper vaccination schedule and we discussed the utility of the currently available vaccines (Flu ,TDaP, Shingles, Covid 19 booster, RSV and Gardasil 9).   3 We discussed the impact of chronic sun exposure and the risk for skin disease as a result. The benefits of a total body scan by a Dermatologist were reviewed, along with sun-protective clothing, eye wear and sunblock.   4 We discussed the need for certain supplements for most people which include vitamin D3 (2000 IU daily), calcium rich foods with limitation on calcium supplementation by tabular form to 600 mg daily.  As part of a bone health program, we recommend weightbearing exercises, and some limited sun exposure (10-15 minutes daily) to help convert vitamin D to its active form.   5) We discussed the importance of an eye exam with dilation and retinal and corneal examination.   6) Mental health is very important and often times under served. We discussed the need for downtime, stress reduction and in the case of feelings such as profound sadness, suicidal or other unusual thoughts--the need to reach out for help.   7) Gun violence is a leading cause of death and injury in children, and it is important to focus on proper storage, gun locks and education of family members if there are guns in the home.

## 2024-08-26 ENCOUNTER — RESULT REVIEW (OUTPATIENT)
Age: 44
End: 2024-08-26

## 2024-08-26 ENCOUNTER — APPOINTMENT (OUTPATIENT)
Dept: ULTRASOUND IMAGING | Facility: CLINIC | Age: 44
End: 2024-08-26
Payer: COMMERCIAL

## 2024-08-26 ENCOUNTER — APPOINTMENT (OUTPATIENT)
Dept: MAMMOGRAPHY | Facility: CLINIC | Age: 44
End: 2024-08-26
Payer: COMMERCIAL

## 2024-08-26 PROCEDURE — 76641 ULTRASOUND BREAST COMPLETE: CPT | Mod: 26,50

## 2024-08-26 PROCEDURE — 77067 SCR MAMMO BI INCL CAD: CPT | Mod: 26

## 2024-08-26 PROCEDURE — 77063 BREAST TOMOSYNTHESIS BI: CPT | Mod: 26
